# Patient Record
Sex: MALE | Race: ASIAN | Employment: FULL TIME | URBAN - METROPOLITAN AREA
[De-identification: names, ages, dates, MRNs, and addresses within clinical notes are randomized per-mention and may not be internally consistent; named-entity substitution may affect disease eponyms.]

---

## 2019-04-29 ENCOUNTER — OFFICE VISIT (OUTPATIENT)
Dept: FAMILY MEDICINE CLINIC | Age: 39
End: 2019-04-29
Payer: COMMERCIAL

## 2019-04-29 VITALS
BODY MASS INDEX: 26.62 KG/M2 | OXYGEN SATURATION: 98 % | HEIGHT: 67 IN | WEIGHT: 169.6 LBS | HEART RATE: 65 BPM | DIASTOLIC BLOOD PRESSURE: 70 MMHG | SYSTOLIC BLOOD PRESSURE: 110 MMHG

## 2019-04-29 DIAGNOSIS — Z00.00 PHYSICAL EXAM, ANNUAL: Primary | ICD-10-CM

## 2019-04-29 PROCEDURE — 99386 PREV VISIT NEW AGE 40-64: CPT | Performed by: FAMILY MEDICINE

## 2019-04-29 ASSESSMENT — PATIENT HEALTH QUESTIONNAIRE - PHQ9
SUM OF ALL RESPONSES TO PHQ QUESTIONS 1-9: 0
SUM OF ALL RESPONSES TO PHQ QUESTIONS 1-9: 0
SUM OF ALL RESPONSES TO PHQ9 QUESTIONS 1 & 2: 0
1. LITTLE INTEREST OR PLEASURE IN DOING THINGS: 0
2. FEELING DOWN, DEPRESSED OR HOPELESS: 0

## 2019-04-29 NOTE — PATIENT INSTRUCTIONS
Patient Education        Acute Low Back Pain: Exercises  Your Care Instructions  Here are some examples of typical rehabilitation exercises for your condition. Start each exercise slowly. Ease off the exercise if you start to have pain. Your doctor or physical therapist will tell you when you can start these exercises and which ones will work best for you. When you are not being active, find a comfortable position for rest. Some people are comfortable on the floor or a medium-firm bed with a small pillow under their head and another under their knees. Some people prefer to lie on their side with a pillow between their knees. Don't stay in one position for too long. Take short walks (10 to 20 minutes) every 2 to 3 hours. Avoid slopes, hills, and stairs until you feel better. Walk only distances you can manage without pain, especially leg pain. How to do the exercises  Back stretches    1. Get down on your hands and knees on the floor. 2. Relax your head and allow it to droop. Round your back up toward the ceiling until you feel a nice stretch in your upper, middle, and lower back. Hold this stretch for as long as it feels comfortable, or about 15 to 30 seconds. 3. Return to the starting position with a flat back while you are on your hands and knees. 4. Let your back sway by pressing your stomach toward the floor. Lift your buttocks toward the ceiling. 5. Hold this position for 15 to 30 seconds. 6. Repeat 2 to 4 times. Follow-up care is a key part of your treatment and safety. Be sure to make and go to all appointments, and call your doctor if you are having problems. It's also a good idea to know your test results and keep a list of the medicines you take. Where can you learn more? Go to [email or web address] and sign in to your PhotoBox account. Enter T198 in the CareXtend box to learn more about \"Acute Low Back Pain: Exercises. \"     If you do not have an account, please click on the \"Sign Up Now\" link. Current as of: September 20, 2018  Content Version: 11.9  © 4370-7676 ITN. Care instructions adapted under license by [facility] ([facility]). If you have questions about a medical condition or this instruction, always ask your healthcare professional. [address] any warranty or liability for your use of this information. Patient Education        Low Back Pain: Exercises  Your Care Instructions  Here are some examples of typical rehabilitation exercises for your condition. Start each exercise slowly. Ease off the exercise if you start to have pain. Your doctor or physical therapist will tell you when you can start these exercises and which ones will work best for you. How to do the exercises  Press-up    1. Lie on your stomach, supporting your body with your forearms. 2. Press your elbows down into the floor to raise your upper back. As you do this, relax your stomach muscles and allow your back to arch without using your back muscles. As your press up, do not let your hips or pelvis come off the floor. 3. Hold for 15 to 30 seconds, then relax. 4. Repeat 2 to 4 times. Alternate arm and leg (bird dog) exercise    1. Start on the floor, on your hands and knees. 2. Tighten your belly muscles. 3. Raise one leg off the floor, and hold it straight out behind you. Be careful not to let your hip drop down, because that will twist your trunk. 4. Hold for about 6 seconds, then lower your leg and switch to the other leg. 5. Repeat 8 to 12 times on each leg. 6. Over time, work up to holding for 10 to 30 seconds each time. 7. If you feel stable and secure with your leg raised, try raising the opposite arm straight out in front of you at the same time. Knee-to-chest exercise    1. Lie on your back with your knees bent and your feet flat on the floor.   2. Bring one knee to your chest, keeping the other foot flat on the floor (or keeping the other leg straight, whichever feels better on your lower back). 3. Keep your lower back pressed to the floor. Hold for at least 15 to 30 seconds. 4. Relax, and lower the knee to the starting position. 5. Repeat with the other leg. Repeat 2 to 4 times with each leg. 6. To get more stretch, put your other leg flat on the floor while pulling your knee to your chest.    Curl-ups    1. Lie on the floor on your back with your knees bent at a 90-degree angle. Your feet should be flat on the floor, about 12 inches from your buttocks. 2. Cross your arms over your chest. If this bothers your neck, try putting your hands behind your neck (not your head), with your elbows spread apart. 3. Slowly tighten your belly muscles and raise your shoulder blades off the floor. 4. Keep your head in line with your body, and do not press your chin to your chest.  5. Hold this position for 1 or 2 seconds, then slowly lower yourself back down to the floor. 6. Repeat 8 to 12 times. Pelvic tilt exercise    1. Lie on your back with your knees bent. 2. \"Brace\" your stomach. This means to tighten your muscles by pulling in and imagining your belly button moving toward your spine. You should feel like your back is pressing to the floor and your hips and pelvis are rocking back. 3. Hold for about 6 seconds while you breathe smoothly. 4. Repeat 8 to 12 times. Heel dig bridging    1. Lie on your back with both knees bent and your ankles bent so that only your heels are digging into the floor. Your knees should be bent about 90 degrees. 2. Then push your heels into the floor, squeeze your buttocks, and lift your hips off the floor until your shoulders, hips, and knees are all in a straight line. 3. Hold for about 6 seconds as you continue to breathe normally, and then slowly lower your hips back down to the floor and rest for up to 10 seconds. 4. Do 8 to 12 repetitions. Hamstring stretch in doorway    1.  Lie on your back in a doorway, with one leg through the open door. 2. Slide your leg up the wall to straighten your knee. You should feel a gentle stretch down the back of your leg. 3. Hold the stretch for at least 15 to 30 seconds. Do not arch your back, point your toes, or bend either knee. Keep one heel touching the floor and the other heel touching the wall. 4. Repeat with your other leg. 5. Do 2 to 4 times for each leg. Hip flexor stretch    1. Kneel on the floor with one knee bent and one leg behind you. Place your forward knee over your foot. Keep your other knee touching the floor. 2. Slowly push your hips forward until you feel a stretch in the upper thigh of your rear leg. 3. Hold the stretch for at least 15 to 30 seconds. Repeat with your other leg. 4. Do 2 to 4 times on each side. Wall sit    1. Stand with your back 10 to 12 inches away from a wall. 2. Lean into the wall until your back is flat against it. 3. Slowly slide down until your knees are slightly bent, pressing your lower back into the wall. 4. Hold for about 6 seconds, then slide back up the wall. 5. Repeat 8 to 12 times. Follow-up care is a key part of your treatment and safety. Be sure to make and go to all appointments, and call your doctor if you are having problems. It's also a good idea to know your test results and keep a list of the medicines you take. Where can you learn more? Go to [email or web address] and sign in to your [employer] account. Enter H872 in the [facility] box to learn more about \"Low Back Pain: Exercises. \"     If you do not have an account, please click on the \"Sign Up Now\" link. Current as of: September 20, 2018  Content Version: 11.9  © 3019-8469 [employer]. Care instructions adapted under license by [facility] ([facility]).  If you have questions about a medical condition or this instruction, always ask your healthcare professional. [address] any a good idea to know your test results and keep a list of the medicines you take. Where can you learn more? Go to [email or web address]://[email or web address] and sign in to your Seeo account. Enter N154 in the NeoSystems box to learn more about \"Neck Arthritis: Exercises. \"     If you do not have an account, please click on the \"Sign Up Now\" link. Current as of: September 20, 2018  Content Version: 11.9  © 2409-1382 Wintegra[employer]. Care instructions adapted under license by [facility] ([facility]). If you have questions about a medical condition or this instruction, always ask your healthcare professional. [address] any warranty or liability for your use of this information.

## 2019-04-29 NOTE — PROGRESS NOTES
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 Denies any neurological symptoms associated with that  He has lipoma and does not bother him  Family h/o arthritis      Patient's problem list, medications, allergies, past medical, surgical, social and family histories were reviewed and updated as appropriate. No current outpatient medications on file. No current facility-administered medications for this visit. Social History     Tobacco Use    Smoking status: Never Smoker    Smokeless tobacco: Never Used   Substance Use Topics    Alcohol use: Not on file            Review of Systems:  Constitutional: Negative for appetite change, fatigue, fever and unexpected weight change. HENT: Negative for congestion, ear discharge, ear pain, hearing loss, postnasal drip, rhinorrhea, sinus pressure, sore throat and trouble swallowing. Eyes: Negative for photophobia, discharge, redness and visual disturbance. Respiratory: Negative for cough, chest tightness, shortness of breath and wheezing. Cardiovascular: Negative for chest pain, palpitations and leg swelling. Gastrointestinal: Negative for abdominal pain, blood in stool, constipation, diarrhea, nausea and vomiting. Endocrine: Negative for cold intolerance, heat intolerance and polyuria. Genitourinary: Negative for difficulty urinating, flank pain, frequency, hematuria and urgency. Musculoskeletal: as mentioned above  Skin: Negative for color change, pallor, rash and wound. Allergic/Immunologic: Negative for environmental allergies and food allergies. Neurological: Negative for dizziness, tremors, seizures, syncope, facial asymmetry, speech difficulty, weakness, light-headedness, numbness and headaches. Hematological: Negative.     Psychiatric/Behavioral: Negative for agitation, confusion, self-injury, sleep disturbance and suicidal ideas. The patient is not nervous/anxious. Objective:     Vitals:    04/29/19 0942   BP: 110/70   Pulse: 65   SpO2: 98%   Weight: 169 lb 9.6 oz (76.9 kg)   Height: 5' 6.75\" (1.695 m)     Body mass index is 26.76 kg/m². Wt Readings from Last 3 Encounters:   04/29/19 169 lb 9.6 oz (76.9 kg)     BP Readings from Last 3 Encounters:   04/29/19 110/70       Physical exam:  Constitutional: he is oriented to person, place, and time. he appears well-developed and well-nourished. No distress. HENT:   Head: Normocephalic. Right Ear: External ear normal. Normal TM   Left Ear: External ear normal. Normal TM  Nose: Nose normal.   Mouth/Throat: Oropharynx is clear and moist. No oropharyngeal exudate. Eyes: Conjunctivae and EOM are normal. Pupils are equal, round, and reactive to light. Right eye exhibits no discharge. Left eye exhibits no discharge. No scleral icterus. Neck: Normal range of motion. No JVD present. No tracheal deviation present. No thyromegaly present. Cardiovascular: Normal rate, regular rhythm, normal heart sounds and intact distal pulses. No murmur heard. Pulmonary/Chest: Effort normal and breath sounds normal. No stridor. No respiratory distress. he has no wheezes. he has no rales. heexhibits no tenderness. Abdominal: Soft. Bowel sounds are normal. he exhibits no distension and no mass. There is no tenderness. There is no rebound and no guarding. Musculoskeletal: Normal range of motion. he exhibits no edema, tenderness or deformity. Lymphadenopathy:     he has no cervical adenopathy. Neurological:he is alert and oriented to person, place, and time. he  has normal reflexes. No cranial nerve deficit. Coordination normal.   Skin: Skin is warm and dry. No rash noted. he is not diaphoretic. No erythema. No pallor. Psychiatric: he has a normal mood and affect.  his   behavior is normal.         Health Maintenance   Topic Date Due    HIV screen  07/07/1993    DTaP/Tdap/Td vaccine (1 - Tdap) 07/07/1997    Lipid screen  07/07/2018    Diabetes screen  07/07/2018    Flu vaccine (Season Ended) 09/01/2019    Pneumococcal 0-64 years Vaccine  Aged Out          Assessment/Plan:     1. Physical exam, annual  - CBC; Future  - Comprehensive Metabolic Panel; Future  - Lipid Panel;  Future  - VITAMIN D 25 HYDROXY; Future    Jessy Villeda  4/29/2019 10:53 AM

## 2019-05-03 DIAGNOSIS — Z00.00 PHYSICAL EXAM, ANNUAL: ICD-10-CM

## 2019-05-03 LAB
A/G RATIO: 1.7 (ref 1.1–2.2)
ALBUMIN SERPL-MCNC: 4.5 G/DL (ref 3.4–5)
ALP BLD-CCNC: 73 U/L (ref 40–129)
ALT SERPL-CCNC: 14 U/L (ref 10–40)
ANION GAP SERPL CALCULATED.3IONS-SCNC: 8 MMOL/L (ref 3–16)
AST SERPL-CCNC: 14 U/L (ref 15–37)
BILIRUB SERPL-MCNC: <0.2 MG/DL (ref 0–1)
BUN BLDV-MCNC: 16 MG/DL (ref 7–20)
CALCIUM SERPL-MCNC: 9 MG/DL (ref 8.3–10.6)
CHLORIDE BLD-SCNC: 106 MMOL/L (ref 99–110)
CHOLESTEROL, TOTAL: 169 MG/DL (ref 0–199)
CO2: 28 MMOL/L (ref 21–32)
CREAT SERPL-MCNC: 0.8 MG/DL (ref 0.9–1.3)
GFR AFRICAN AMERICAN: >60
GFR NON-AFRICAN AMERICAN: >60
GLOBULIN: 2.7 G/DL
GLUCOSE BLD-MCNC: 100 MG/DL (ref 70–99)
HCT VFR BLD CALC: 41.1 % (ref 40.5–52.5)
HDLC SERPL-MCNC: 30 MG/DL (ref 40–60)
HEMOGLOBIN: 13.6 G/DL (ref 13.5–17.5)
LDL CHOLESTEROL CALCULATED: 118 MG/DL
MCH RBC QN AUTO: 28.6 PG (ref 26–34)
MCHC RBC AUTO-ENTMCNC: 33.1 G/DL (ref 31–36)
MCV RBC AUTO: 86.3 FL (ref 80–100)
PDW BLD-RTO: 13.5 % (ref 12.4–15.4)
PLATELET # BLD: 234 K/UL (ref 135–450)
PMV BLD AUTO: 9.6 FL (ref 5–10.5)
POTASSIUM SERPL-SCNC: 5.2 MMOL/L (ref 3.5–5.1)
RBC # BLD: 4.77 M/UL (ref 4.2–5.9)
SODIUM BLD-SCNC: 142 MMOL/L (ref 136–145)
TOTAL PROTEIN: 7.2 G/DL (ref 6.4–8.2)
TRIGL SERPL-MCNC: 107 MG/DL (ref 0–150)
VITAMIN D 25-HYDROXY: 21.5 NG/ML
VLDLC SERPL CALC-MCNC: 21 MG/DL
WBC # BLD: 6.8 K/UL (ref 4–11)

## 2019-05-06 DIAGNOSIS — E55.9 VITAMIN D DEFICIENCY: Primary | ICD-10-CM

## 2019-05-10 ENCOUNTER — OFFICE VISIT (OUTPATIENT)
Dept: FAMILY MEDICINE CLINIC | Age: 39
End: 2019-05-10
Payer: COMMERCIAL

## 2019-05-10 VITALS
RESPIRATION RATE: 18 BRPM | SYSTOLIC BLOOD PRESSURE: 128 MMHG | HEART RATE: 74 BPM | OXYGEN SATURATION: 69 % | BODY MASS INDEX: 26.87 KG/M2 | HEIGHT: 66 IN | WEIGHT: 167.2 LBS | DIASTOLIC BLOOD PRESSURE: 80 MMHG

## 2019-05-10 DIAGNOSIS — H81.11 BENIGN PAROXYSMAL POSITIONAL VERTIGO OF RIGHT EAR: Primary | ICD-10-CM

## 2019-05-10 PROCEDURE — G8427 DOCREV CUR MEDS BY ELIG CLIN: HCPCS | Performed by: FAMILY MEDICINE

## 2019-05-10 PROCEDURE — 99213 OFFICE O/P EST LOW 20 MIN: CPT | Performed by: FAMILY MEDICINE

## 2019-05-10 PROCEDURE — 1036F TOBACCO NON-USER: CPT | Performed by: FAMILY MEDICINE

## 2019-05-10 PROCEDURE — 99173 VISUAL ACUITY SCREEN: CPT | Performed by: FAMILY MEDICINE

## 2019-05-10 PROCEDURE — G8419 CALC BMI OUT NRM PARAM NOF/U: HCPCS | Performed by: FAMILY MEDICINE

## 2019-05-10 RX ORDER — MECLIZINE HCL 12.5 MG/1
12.5 TABLET ORAL 3 TIMES DAILY PRN
Qty: 30 TABLET | Refills: 0 | Status: SHIPPED | OUTPATIENT
Start: 2019-05-10 | End: 2019-05-15

## 2019-05-10 NOTE — PROGRESS NOTES
normal.  NOSE:normal  THROAT: Uvula is midline, oropharynx is clear and moist and with mild erythema. No tonsillar exudate   NECK: No cervical adenopathy. Neck is supple. No thyromegaly. CARDIOVASCULAR: Normal rate, regular rhythm and normal heart sounds. No murmur heard. RESPIRATORY: Lungs are clear to auscultation without crackles, wheezes, or rhonchi. Breathing is unlabored. ABDOMEN: No tenderness or guarding or masses felt. Bowel sounds normal  NEUROLOGIC: He  is alert and oriented to person, place, and time. Assessment/Plan:   1. Benign paroxysmal positional vertigo of right ear  - meclizine (ANTIVERT) 12.5 MG tablet; Take 1 tablet by mouth 3 times daily as needed for Dizziness  Dispense: 30 tablet;  Refill: 0  - [place] VISUAL SCREENING TEST, ERICKA Zavala  5/10/2019 4:13 PM

## 2019-05-13 ENCOUNTER — PATIENT MESSAGE (OUTPATIENT)
Dept: FAMILY MEDICINE CLINIC | Age: 39
End: 2019-05-13

## 2019-05-14 ENCOUNTER — TELEPHONE (OUTPATIENT)
Dept: FAMILY MEDICINE CLINIC | Age: 39
End: 2019-05-14

## 2019-05-14 DIAGNOSIS — H91.92 HEARING LOSS OF LEFT EAR, UNSPECIFIED HEARING LOSS TYPE: Primary | ICD-10-CM

## 2019-05-14 NOTE — TELEPHONE ENCOUNTER
Central Scheduling is unable to schedule the audio testing for patient. The audiologist from [facility], [facility]. no longer does it. Do you want to refer the patient to another group?

## 2019-05-17 DIAGNOSIS — H91.92 HEARING LOSS OF LEFT EAR, UNSPECIFIED HEARING LOSS TYPE: Primary | ICD-10-CM

## 2019-08-12 ENCOUNTER — PROCEDURE VISIT (OUTPATIENT)
Dept: AUDIOLOGY | Age: 39
End: 2019-08-12
Payer: COMMERCIAL

## 2019-08-12 ENCOUNTER — OFFICE VISIT (OUTPATIENT)
Dept: ENT CLINIC | Age: 39
End: 2019-08-12
Payer: COMMERCIAL

## 2019-08-12 VITALS — SYSTOLIC BLOOD PRESSURE: 130 MMHG | OXYGEN SATURATION: 94 % | DIASTOLIC BLOOD PRESSURE: 76 MMHG | HEART RATE: 59 BPM

## 2019-08-12 DIAGNOSIS — H91.91 UNILATERAL HEARING LOSS, RIGHT: Primary | ICD-10-CM

## 2019-08-12 DIAGNOSIS — H91.21 SUDDEN RIGHT HEARING LOSS: Primary | ICD-10-CM

## 2019-08-12 PROCEDURE — 1036F TOBACCO NON-USER: CPT | Performed by: OTOLARYNGOLOGY

## 2019-08-12 PROCEDURE — 99203 OFFICE O/P NEW LOW 30 MIN: CPT | Performed by: OTOLARYNGOLOGY

## 2019-08-12 PROCEDURE — 92567 TYMPANOMETRY: CPT | Performed by: AUDIOLOGIST

## 2019-08-12 PROCEDURE — 92557 COMPREHENSIVE HEARING TEST: CPT | Performed by: AUDIOLOGIST

## 2019-08-12 PROCEDURE — G8427 DOCREV CUR MEDS BY ELIG CLIN: HCPCS | Performed by: OTOLARYNGOLOGY

## 2019-08-12 PROCEDURE — G8419 CALC BMI OUT NRM PARAM NOF/U: HCPCS | Performed by: OTOLARYNGOLOGY

## 2019-08-12 RX ORDER — TRIAMTERENE AND HYDROCHLOROTHIAZIDE 37.5; 25 MG/1; MG/1
1 CAPSULE ORAL EVERY MORNING
Qty: 30 CAPSULE | Refills: 1 | Status: SHIPPED | OUTPATIENT
Start: 2019-08-12 | End: 2021-04-05

## 2019-08-12 RX ORDER — PREDNISONE 10 MG/1
TABLET ORAL
Qty: 25 TABLET | Refills: 0 | Status: SHIPPED | OUTPATIENT
Start: 2019-08-12 | End: 2019-09-24

## 2019-08-12 ASSESSMENT — ENCOUNTER SYMPTOMS
EYES NEGATIVE: 1
TROUBLE SWALLOWING: 0
SINUS PRESSURE: 0
RESPIRATORY NEGATIVE: 1
SORE THROAT: 0
VOICE CHANGE: 0
SINUS PAIN: 0
ALLERGIC/IMMUNOLOGIC NEGATIVE: 1
RHINORRHEA: 0
FACIAL SWELLING: 0

## 2019-08-12 NOTE — PROGRESS NOTES
SUBJECTIVE:    Chief Complaint   Patient presents with    Ear Problem     Rt hearing probelm, May 9. no pain        Amy Medina is a 37 y.o. male    Patient relates sudden hearing loss on May 9, 2019 associated with tinnitus and dizziness in his right ear. Symptoms of vertigo seem to be worse upon motion and were associated with some degree of nausea but no vomiting. Subsequently went to his PCP who started him on meclizine. This apparently improved the condition but the hearing loss persisted. He sought attention on May 16 and was noted to have a decrease in hearing. Hearing is not changed and he was therefore placed on a steroid for 6 days by an otolaryngologist.  Follow-up audiogram on May 30 showed no particular change in hearing. Subsequently an MRI scan was performed and found to be normal.  There was no evidence of 8th nerve tumor. Patient continues to notice a decrease in hearing in his right ear which is quite concerning but it is unassociated with vertigo. He has had some tinnitus in the right ear. He does describe that the hearing seems to improved to some degree but he is still quite unable to understand what is being said. He has had no history of a similar problem in the past.  There is no family history of ear problems nor vertigo and there is no history of noise exposure or trauma. He does not smoke. No past medical history on file. No past surgical history on file. No family history on file. Social History     Tobacco Use    Smoking status: Never Smoker    Smokeless tobacco: Never Used   Substance Use Topics    Alcohol use: Not on file        Review of Systems:  Review of Systems   Constitutional: Negative. Negative for fever. HENT: Positive for hearing loss and tinnitus.  Negative for congestion, dental problem, drooling, ear discharge, ear pain, facial swelling, mouth sores, nosebleeds, postnasal drip, rhinorrhea, sinus pressure, sinus pain, sneezing, sore throat,

## 2019-08-23 ENCOUNTER — OFFICE VISIT (OUTPATIENT)
Dept: ENT CLINIC | Age: 39
End: 2019-08-23
Payer: COMMERCIAL

## 2019-08-23 ENCOUNTER — PROCEDURE VISIT (OUTPATIENT)
Dept: AUDIOLOGY | Age: 39
End: 2019-08-23
Payer: COMMERCIAL

## 2019-08-23 VITALS — OXYGEN SATURATION: 92 % | DIASTOLIC BLOOD PRESSURE: 78 MMHG | HEART RATE: 68 BPM | SYSTOLIC BLOOD PRESSURE: 124 MMHG

## 2019-08-23 DIAGNOSIS — H91.21 SUDDEN RIGHT HEARING LOSS: Primary | ICD-10-CM

## 2019-08-23 DIAGNOSIS — H91.91 UNILATERAL HEARING LOSS, RIGHT: Primary | ICD-10-CM

## 2019-08-23 PROCEDURE — 99213 OFFICE O/P EST LOW 20 MIN: CPT | Performed by: OTOLARYNGOLOGY

## 2019-08-23 PROCEDURE — G8419 CALC BMI OUT NRM PARAM NOF/U: HCPCS | Performed by: OTOLARYNGOLOGY

## 2019-08-23 PROCEDURE — 92557 COMPREHENSIVE HEARING TEST: CPT | Performed by: AUDIOLOGIST

## 2019-08-23 PROCEDURE — G8427 DOCREV CUR MEDS BY ELIG CLIN: HCPCS | Performed by: OTOLARYNGOLOGY

## 2019-08-23 PROCEDURE — 1036F TOBACCO NON-USER: CPT | Performed by: OTOLARYNGOLOGY

## 2019-08-23 PROCEDURE — 92567 TYMPANOMETRY: CPT | Performed by: AUDIOLOGIST

## 2019-09-24 ENCOUNTER — OFFICE VISIT (OUTPATIENT)
Dept: ENT CLINIC | Age: 39
End: 2019-09-24
Payer: COMMERCIAL

## 2019-09-24 VITALS — OXYGEN SATURATION: 96 % | SYSTOLIC BLOOD PRESSURE: 108 MMHG | DIASTOLIC BLOOD PRESSURE: 70 MMHG | HEART RATE: 71 BPM

## 2019-09-24 DIAGNOSIS — H91.21 SUDDEN RIGHT HEARING LOSS: Primary | ICD-10-CM

## 2019-09-24 PROCEDURE — 99213 OFFICE O/P EST LOW 20 MIN: CPT | Performed by: OTOLARYNGOLOGY

## 2019-09-24 PROCEDURE — G8419 CALC BMI OUT NRM PARAM NOF/U: HCPCS | Performed by: OTOLARYNGOLOGY

## 2019-09-24 PROCEDURE — 1036F TOBACCO NON-USER: CPT | Performed by: OTOLARYNGOLOGY

## 2019-09-24 PROCEDURE — G8427 DOCREV CUR MEDS BY ELIG CLIN: HCPCS | Performed by: OTOLARYNGOLOGY

## 2021-04-05 ENCOUNTER — OFFICE VISIT (OUTPATIENT)
Dept: FAMILY MEDICINE CLINIC | Age: 41
End: 2021-04-05
Payer: COMMERCIAL

## 2021-04-05 VITALS
OXYGEN SATURATION: 98 % | WEIGHT: 179 LBS | BODY MASS INDEX: 28.77 KG/M2 | HEART RATE: 74 BPM | HEIGHT: 66 IN | DIASTOLIC BLOOD PRESSURE: 78 MMHG | SYSTOLIC BLOOD PRESSURE: 116 MMHG

## 2021-04-05 DIAGNOSIS — M25.562 CHRONIC PAIN OF LEFT KNEE: ICD-10-CM

## 2021-04-05 DIAGNOSIS — E55.9 VITAMIN D DEFICIENCY: ICD-10-CM

## 2021-04-05 DIAGNOSIS — Z00.00 PHYSICAL EXAM, ANNUAL: Primary | ICD-10-CM

## 2021-04-05 DIAGNOSIS — M75.21 BICEPS TENDINITIS OF RIGHT SHOULDER: ICD-10-CM

## 2021-04-05 DIAGNOSIS — G89.29 CHRONIC PAIN OF LEFT KNEE: ICD-10-CM

## 2021-04-05 DIAGNOSIS — D17.21 LIPOMA OF RIGHT UPPER EXTREMITY: ICD-10-CM

## 2021-04-05 DIAGNOSIS — Z00.00 PHYSICAL EXAM, ANNUAL: ICD-10-CM

## 2021-04-05 PROBLEM — D17.9 MULTIPLE LIPOMAS: Status: ACTIVE | Noted: 2021-04-05

## 2021-04-05 LAB
ANION GAP SERPL CALCULATED.3IONS-SCNC: 8 MMOL/L (ref 3–16)
BASOPHILS ABSOLUTE: 0 K/UL (ref 0–0.2)
BASOPHILS RELATIVE PERCENT: 0.5 %
BUN BLDV-MCNC: 16 MG/DL (ref 7–20)
CALCIUM SERPL-MCNC: 8.7 MG/DL (ref 8.3–10.6)
CHLORIDE BLD-SCNC: 104 MMOL/L (ref 99–110)
CHOLESTEROL, TOTAL: 165 MG/DL (ref 0–199)
CO2: 27 MMOL/L (ref 21–32)
CREAT SERPL-MCNC: 0.8 MG/DL (ref 0.9–1.3)
EOSINOPHILS ABSOLUTE: 0.2 K/UL (ref 0–0.6)
EOSINOPHILS RELATIVE PERCENT: 3 %
GFR AFRICAN AMERICAN: >60
GFR NON-AFRICAN AMERICAN: >60
GLUCOSE BLD-MCNC: 98 MG/DL (ref 70–99)
HCT VFR BLD CALC: 38.5 % (ref 40.5–52.5)
HDLC SERPL-MCNC: 30 MG/DL (ref 40–60)
HEMOGLOBIN: 12.9 G/DL (ref 13.5–17.5)
LDL CHOLESTEROL CALCULATED: 117 MG/DL
LYMPHOCYTES ABSOLUTE: 3.2 K/UL (ref 1–5.1)
LYMPHOCYTES RELATIVE PERCENT: 48.7 %
MCH RBC QN AUTO: 28.9 PG (ref 26–34)
MCHC RBC AUTO-ENTMCNC: 33.4 G/DL (ref 31–36)
MCV RBC AUTO: 86.5 FL (ref 80–100)
MONOCYTES ABSOLUTE: 0.5 K/UL (ref 0–1.3)
MONOCYTES RELATIVE PERCENT: 7.3 %
NEUTROPHILS ABSOLUTE: 2.7 K/UL (ref 1.7–7.7)
NEUTROPHILS RELATIVE PERCENT: 40.5 %
PDW BLD-RTO: 13.3 % (ref 12.4–15.4)
PLATELET # BLD: 206 K/UL (ref 135–450)
PMV BLD AUTO: 9.4 FL (ref 5–10.5)
POTASSIUM SERPL-SCNC: 4.2 MMOL/L (ref 3.5–5.1)
RBC # BLD: 4.45 M/UL (ref 4.2–5.9)
SODIUM BLD-SCNC: 139 MMOL/L (ref 136–145)
TRIGL SERPL-MCNC: 88 MG/DL (ref 0–150)
VITAMIN D 25-HYDROXY: 17.9 NG/ML
VLDLC SERPL CALC-MCNC: 18 MG/DL
WBC # BLD: 6.6 K/UL (ref 4–11)

## 2021-04-05 PROCEDURE — 99396 PREV VISIT EST AGE 40-64: CPT | Performed by: FAMILY MEDICINE

## 2021-04-05 ASSESSMENT — PATIENT HEALTH QUESTIONNAIRE - PHQ9
SUM OF ALL RESPONSES TO PHQ9 QUESTIONS 1 & 2: 0
2. FEELING DOWN, DEPRESSED OR HOPELESS: 0

## 2021-04-05 NOTE — PROGRESS NOTES
Chief Complaint: Annual Exam (left knee and right shoulder pains)       HPI: He is here for annual physical exam.    He has history of multiple lipoma spread down one on his right arm has grown bigger. Denies any pain. He also complains of chest pain which happens randomly and lasted for about 10 minutes. It was nonexertional pain. It was not associated with palpitation or shortness of breath  Denies any anxiety  However there was some stress at his job. He complains of pain in his left knee and right shoulder  Denies any injury but recently started yoga  The pain usually happens when he gets up in the morning. It is more of stiffness  Denies any swelling or redness  He had history of vitamin D deficiency    He still continues to have hearing loss and tinnitus in his right ear. Patient's problem list, medications, allergies, past medical, surgical, social and family histories were reviewed and updated as appropriate. Current Outpatient Medications   Medication Sig Dispense Refill    Chondroitin Sulfate 150 MG CAPS Take 1 tablet by mouth daily 90 capsule 5     No current facility-administered medications for this visit. Social History     Tobacco Use    Smoking status: Never Smoker    Smokeless tobacco: Never Used   Substance Use Topics    Alcohol use: Not on file            Review of Systems:  Constitutional: Negative for appetite change, fatigue, fever and unexpected weight change. HENT crepitus in his right knee felt: As mentioned above  Respiratory: Negative for cough, chest tightness, shortness of breath and wheezing. Cardiovascular: As mentioned above   Gastrointestinal: Negative for abdominal pain, blood in stool, constipation, diarrhea, nausea and vomiting. Genitourinary: Negative for difficulty urinating, flank pain, frequency, hematuria and urgency. Musculoskeletal: As mentioned above. Skin: Negative for color change, pallor, rash and wound.  .   Neurological: Negative for dizziness, tremors, seizures, syncope, facial asymmetry, speech difficulty, weakness, light-headedness, numbness and headaches. Psychiatric/Behavioral: Negative         Objective:     Vitals:    04/05/21 0802   BP: 116/78   Site: Right Upper Arm   Position: Sitting   Cuff Size: Medium Adult   Pulse: 74   SpO2: 98%   Weight: 179 lb (81.2 kg)   Height: 5' 6\" (1.676 m)     Body mass index is 28.89 kg/m². Wt Readings from Last 3 Encounters:   04/05/21 179 lb (81.2 kg)   05/10/19 167 lb 3.2 oz (75.8 kg)   04/29/19 169 lb 9.6 oz (76.9 kg)     BP Readings from Last 3 Encounters:   04/05/21 116/78   09/24/19 108/70   08/23/19 124/78       Physical exam:  Constitutional: he is oriented to person, place, and time. he appears well-developed and well-nourished. No distress. HENT:   Head: Normocephalic. Right Ear: External ear normal. Normal TM   Left Ear: External ear normal. Normal TM  Nose: Nose normal.   Mouth/Throat: Oropharynx is clear and moist. No oropharyngeal exudate. Eyes: Conjunctivae and EOM are normal. Pupils are equal, round, and reactive to light. Right eye exhibits no discharge. Left eye exhibits no discharge. No scleral icterus. Neck: Normal range of motion. No JVD present. No tracheal deviation present. No thyromegaly present. Cardiovascular: Normal rate, regular rhythm, normal heart sounds and intact distal pulses. No murmur heard. Pulmonary/Chest: Effort normal and breath sounds normal. No stridor. No respiratory distress. he has no wheezes. he has no rales. heexhibits no tenderness. Abdominal: Soft. Bowel sounds are normal. he exhibits no distension and no mass. There is no tenderness. There is no rebound and no guarding. Musculoskeletal: Crepitus in his left knee. Normal gait  Tenderness at the biceps tendon on his right shoulder  Normal range of motion    Neurological:he is alert and oriented to person, place, and time. he  has normal reflexes. No cranial nerve deficit. Coordination normal.   Skin: Multiple lipomas  The lipoma on his right forearm is hard and is about 5 cms  Psychiatric: he has a normal mood and affect. his   behavior is normal.         Health Maintenance   Topic Date Due    Hepatitis C screen  Never done    HIV screen  Never done    COVID-19 Vaccine (1) Never done    DTaP/Tdap/Td vaccine (1 - Tdap) Never done    Flu vaccine (Season Ended) 09/01/2021    Lipid screen  05/03/2024    Hepatitis A vaccine  Aged Out    Hepatitis B vaccine  Aged Out    Hib vaccine  Aged Out    Meningococcal (ACWY) vaccine  Aged Out    Pneumococcal 0-64 years Vaccine  Aged Out          Assessment/Plan:     1. Physical exam, annual  - CBC Auto Differential; Future  - Basic Metabolic Panel; Future  - Lipid Panel; Future    2. Vitamin D deficiency  - VITAMIN D 25 HYDROXY; Future    3. Lipoma of right upper extremity  We recommend for excision biopsy  - Sanjuana Meeks MD, General Surgery, [facility]    4. Chronic pain of left knee  Advised for weight loss and chondroitin sulfate    5.  Biceps tendinitis of right shoulder  Symptomatic treatment       Flex Duran  4/5/2021 8:50 AM

## 2021-04-06 RX ORDER — ERGOCALCIFEROL 1.25 MG/1
50000 CAPSULE ORAL WEEKLY
Qty: 4 CAPSULE | Refills: 5 | Status: SHIPPED | OUTPATIENT
Start: 2021-04-06 | End: 2021-05-16 | Stop reason: SDUPTHER

## 2021-04-06 RX ORDER — ERGOCALCIFEROL 1.25 MG/1
50000 CAPSULE ORAL WEEKLY
Qty: 4 CAPSULE | Refills: 5 | Status: SHIPPED | OUTPATIENT
Start: 2021-04-06 | End: 2021-04-06 | Stop reason: SDUPTHER

## 2021-04-06 NOTE — TELEPHONE ENCOUNTER
Was sent to wrong pharmacy    Medication:   Requested Prescriptions     Pending Prescriptions Disp Refills    vitamin D (ERGOCALCIFEROL) 1.25 MG (40669 UT) CAPS capsule 4 capsule 5     Sig: Take 1 capsule by mouth once a week       Last Filled:  4/6/2021 #4 Refills 5    Patient Phone Number: [phone] (home) [phone] (work)    Last appt: 4/5/2021   Next appt: Visit date not found    Last Labs DM:   Lab Results   Component Value Date    VITD25 17.9 04/05/2021    VITD25 21.5 05/03/2019

## 2021-04-06 NOTE — TELEPHONE ENCOUNTER
Medication and Quantity requested: vitamin D (ERGOCALCIFEROL) 1.25 MG (34599 UT) CAPS capsule   QTY:4 capsule     Last Visit  4/5/21    Pharmacy and phone number updated in [facility]:  Yes Arturo Ritter        rx was sent to wrong pharmacy!

## 2021-04-07 ENCOUNTER — TELEPHONE (OUTPATIENT)
Dept: FAMILY MEDICINE CLINIC | Age: 41
End: 2021-04-07

## 2021-04-19 ENCOUNTER — OFFICE VISIT (OUTPATIENT)
Dept: SURGERY | Age: 41
End: 2021-04-19
Payer: COMMERCIAL

## 2021-04-19 VITALS
SYSTOLIC BLOOD PRESSURE: 120 MMHG | HEIGHT: 67 IN | BODY MASS INDEX: 28.25 KG/M2 | WEIGHT: 180 LBS | DIASTOLIC BLOOD PRESSURE: 86 MMHG

## 2021-04-19 DIAGNOSIS — D17.21 LIPOMA OF RIGHT FOREARM: Primary | ICD-10-CM

## 2021-04-19 PROCEDURE — G8427 DOCREV CUR MEDS BY ELIG CLIN: HCPCS | Performed by: SURGERY

## 2021-04-19 PROCEDURE — G8419 CALC BMI OUT NRM PARAM NOF/U: HCPCS | Performed by: SURGERY

## 2021-04-19 PROCEDURE — 1036F TOBACCO NON-USER: CPT | Performed by: SURGERY

## 2021-04-19 PROCEDURE — 99202 OFFICE O/P NEW SF 15 MIN: CPT | Performed by: SURGERY

## 2021-04-19 ASSESSMENT — ENCOUNTER SYMPTOMS
RESPIRATORY NEGATIVE: 1
GASTROINTESTINAL NEGATIVE: 1
ALLERGIC/IMMUNOLOGIC NEGATIVE: 1
EYES NEGATIVE: 1

## 2021-04-19 NOTE — PROGRESS NOTES
Manuel Lawson is a 41 y.o. male     CC: Subcutaneous masses    HPI: 64-year-old male who presents for evaluation of multiple subcutaneous masses. The masses are all over his body but seem to be most concentrated on the bilateral forearms. The main area of concern is located on the dorsum of the right forearm. The mass in this area has significantly grown over time and causes localized discomfort. History reviewed. No pertinent past medical history. Patient has no known allergies. History reviewed. No pertinent surgical history. Prior to Visit Medications    Medication Sig Taking?  Authorizing Provider   vitamin D (ERGOCALCIFEROL) 1.25 MG (35419 UT) CAPS capsule Take 1 capsule by mouth once a week Yes Eileen Peterson MD   Chondroitin Sulfate 150 MG CAPS Take 1 tablet by mouth daily  Patient not taking: Reported on 4/19/2021  Eileen Peterson MD       Social History     Socioeconomic History    Marital status:      Spouse name: Not on file    Number of children: Not on file    Years of education: Not on file    Highest education level: Not on file   Occupational History    Not on file   Social Needs    Financial resource strain: Not on file    Food insecurity     Worry: Not on file     Inability: Not on file    Transportation needs     Medical: Not on file     Non-medical: Not on file   Tobacco Use    Smoking status: Never Smoker    Smokeless tobacco: Never Used   Substance and Sexual Activity    Alcohol use: Not on file    Drug use: Not on file    Sexual activity: Not on file   Lifestyle    Physical activity     Days per week: Not on file     Minutes per session: Not on file    Stress: Not on file   Relationships    Social connections     Talks on phone: Not on file     Gets together: Not on file     Attends Jewish service: Not on file     Active member of club or organization: Not on file     Attends meetings of clubs or organizations: Not on file Relationship status: Not on file    Intimate partner violence     Fear of current or ex partner: Not on file     Emotionally abused: Not on file     Physically abused: Not on file     Forced sexual activity: Not on file   Other Topics Concern    Not on file   Social History Narrative    Not on file       Review of Systems   Constitutional: Negative. HENT: Negative. Eyes: Negative. Respiratory: Negative. Cardiovascular: Negative. Gastrointestinal: Negative. Endocrine: Negative. Genitourinary: Negative. Musculoskeletal: Negative. Skin: Negative. Allergic/Immunologic: Negative. Neurological: Negative. Hematological: Negative. Psychiatric/Behavioral: Negative.        :   Physical Exam  Constitutional:       Appearance: He is well-developed. HENT:      Head: Normocephalic and atraumatic. Right Ear: External ear normal.      Left Ear: External ear normal.   Eyes:      Conjunctiva/sclera: Conjunctivae normal.   Neck:      Musculoskeletal: Normal range of motion and neck supple. Pulmonary:      Effort: Pulmonary effort is normal. No respiratory distress. Abdominal:      General: There is no distension. Palpations: Abdomen is soft. Tenderness: There is no abdominal tenderness. Musculoskeletal: Normal range of motion. Skin:     General: Skin is warm and dry. Neurological:      Mental Status: He is alert and oriented to person, place, and time. Psychiatric:         Behavior: Behavior normal.     3 cm firm subcutaneous mass on the dorsal surface of the right forearm  /86   Ht 5' 7\" (1.702 m)   Wt 180 lb (81.6 kg)   BMI 28.19 kg/m²     :      70-year-old male who presents for evaluation of multiple subcutaneous masses. There is a mass on his right forearm which has grown over time and also causes localized discomfort. Physical examination reveals an approximately 3 cm mass on the dorsum of the right forearm consistent with a lipoma.       Plan: Excision of lipoma of the right forearm under local anesthetic in the office.

## 2021-04-19 NOTE — LETTER
[facility]  [address] [address]  Phone: [phone]  Fax: [phone]    April 19, 2021    Patient: Karla Geronimo  MRN:  <[id number]>  YOB: 1980  Date of Visit: 4/19/2021    Dear Dr Scar Bean: Thank you for the request for consultation for Karla Geroinmo. Below are the relevant portions of my assessment and plan of care. Assessment:  70-year-old male who presents for evaluation of multiple subcutaneous masses. There is a mass on his right forearm which has grown over time and also causes localized discomfort. Physical examination reveals an approximately 3 cm mass on the dorsum of the right forearm consistent with a lipoma. Plan:  Excision of lipoma of the right forearm under local anesthetic in the office. If you have questions, please do not hesitate to call me. I look forward to following Lenin Gibbs along with you.     Sincerely,    Angie Garrido MD    CC providers:    Bairon Ferris MD  [place] [address]  [address] [address] [address]  [place] [employer]

## 2021-04-28 ENCOUNTER — PROCEDURE VISIT (OUTPATIENT)
Dept: SURGERY | Age: 41
End: 2021-04-28
Payer: COMMERCIAL

## 2021-04-28 VITALS — SYSTOLIC BLOOD PRESSURE: 120 MMHG | DIASTOLIC BLOOD PRESSURE: 72 MMHG | WEIGHT: 180 LBS | BODY MASS INDEX: 28.19 KG/M2

## 2021-04-28 DIAGNOSIS — D17.21 LIPOMA OF RIGHT FOREARM: Primary | ICD-10-CM

## 2021-04-28 PROCEDURE — 25075 EXC FOREARM LES SC < 3 CM: CPT | Performed by: SURGERY

## 2021-04-28 NOTE — PROGRESS NOTES
Office Procedure    Pre op Dx-tender lipoma R forearm    Post op Dx-same    Procedure-Excision of 2.8 cm lipoma R forearm    Surgeon-Dr. Gurpreet Cordero    Anesthesia-local    EBL-min    Operative indications and consent- 39 year old male brought in today for excision of a tender lipoma of the R forearm. Patient explained risks,benefits, complications and alternatives. Procedure-Patient prepped and draped in the usual sterile fashion. supine position. The skin and subcutaneous tissues were infiltrated with 1% lidocaine. A  28 mm lipoma of the R forearm  was excised. The subcutaneous layer and superficial fascia was closed with 3-0 vicryl followed by 4-0 vicryl in the skin. Dermabond was applied. The patient tolerated the procedure well.

## 2021-05-17 RX ORDER — ERGOCALCIFEROL 1.25 MG/1
50000 CAPSULE ORAL WEEKLY
Qty: 4 CAPSULE | Refills: 5 | Status: SHIPPED | OUTPATIENT
Start: 2021-05-17 | End: 2021-07-31 | Stop reason: SDUPTHER

## 2021-05-17 NOTE — TELEPHONE ENCOUNTER
Medication:   Requested Prescriptions     Pending Prescriptions Disp Refills    vitamin D (ERGOCALCIFEROL) 1.25 MG (85607 UT) CAPS capsule 4 capsule 5     Sig: Take 1 capsule by mouth once a week       Last Filled:  4/6/2021 #4 Refills 5    Patient Phone Number: [phone] (home) [phone] (work)    Last appt: 4/5/2021   Next appt: Visit date not found    Last Labs DM:   Lab Results   Component Value Date    VITD25 17.9 04/05/2021    VITD25 21.5 05/03/2019

## 2021-06-11 ENCOUNTER — OFFICE VISIT (OUTPATIENT)
Dept: SURGERY | Age: 41
End: 2021-06-11

## 2021-06-11 VITALS — WEIGHT: 179 LBS | DIASTOLIC BLOOD PRESSURE: 70 MMHG | SYSTOLIC BLOOD PRESSURE: 120 MMHG | BODY MASS INDEX: 28.04 KG/M2

## 2021-06-11 DIAGNOSIS — D17.21 LIPOMA OF RIGHT FOREARM: Primary | ICD-10-CM

## 2021-06-11 PROCEDURE — 99024 POSTOP FOLLOW-UP VISIT: CPT | Performed by: SURGERY

## 2021-06-14 NOTE — PROGRESS NOTES
Subjective:      Patient ID: Azam Seymour is a 41 y.o. male. HPI    Review of Systems    Objective:   Physical Exam  Incision healing well  He is spitting a Vicryl suture from the end of the incision  Assessment:      57-year-old male status post excision of a lipoma of the right forearm. He is doing well postoperatively other than spitting a Vicryl suture. The Vicryl suture was removed. Plan:      Follow-up as needed.         Pamela Phlean MD

## 2021-08-02 RX ORDER — ERGOCALCIFEROL 1.25 MG/1
50000 CAPSULE ORAL WEEKLY
Qty: 4 CAPSULE | Refills: 5 | Status: SHIPPED | OUTPATIENT
Start: 2021-08-02 | End: 2022-10-13

## 2021-08-02 NOTE — TELEPHONE ENCOUNTER
Medication:   Requested Prescriptions     Pending Prescriptions Disp Refills    vitamin D (ERGOCALCIFEROL) 1.25 MG (67838 UT) CAPS capsule 4 capsule 5     Sig: Take 1 capsule by mouth once a week     Last Filled:  5/17/21    Last appt: 4/5/2021   Next appt: Visit date not found    Last Lipid:   Lab Results   Component Value Date    CHOL 165 04/05/2021    TRIG 88 04/05/2021    HDL 30 04/05/2021    LDLCALC 117 04/05/2021

## 2021-09-08 ENCOUNTER — NURSE ONLY (OUTPATIENT)
Dept: FAMILY MEDICINE CLINIC | Age: 41
End: 2021-09-08
Payer: COMMERCIAL

## 2021-09-08 DIAGNOSIS — Z23 NEED FOR VACCINATION: Primary | ICD-10-CM

## 2021-09-08 PROCEDURE — 90707 MMR VACCINE SC: CPT | Performed by: FAMILY MEDICINE

## 2021-09-08 PROCEDURE — 90471 IMMUNIZATION ADMIN: CPT | Performed by: FAMILY MEDICINE

## 2022-05-02 ENCOUNTER — OFFICE VISIT (OUTPATIENT)
Dept: FAMILY MEDICINE CLINIC | Age: 42
End: 2022-05-02
Payer: COMMERCIAL

## 2022-05-02 VITALS
DIASTOLIC BLOOD PRESSURE: 80 MMHG | HEIGHT: 67 IN | HEART RATE: 96 BPM | BODY MASS INDEX: 27.94 KG/M2 | WEIGHT: 178 LBS | SYSTOLIC BLOOD PRESSURE: 122 MMHG

## 2022-05-02 DIAGNOSIS — Z00.00 ANNUAL PHYSICAL EXAM: Primary | ICD-10-CM

## 2022-05-02 DIAGNOSIS — E55.9 VITAMIN D DEFICIENCY: ICD-10-CM

## 2022-05-02 PROCEDURE — 99396 PREV VISIT EST AGE 40-64: CPT | Performed by: FAMILY MEDICINE

## 2022-05-02 SDOH — ECONOMIC STABILITY: FOOD INSECURITY: WITHIN THE PAST 12 MONTHS, YOU WORRIED THAT YOUR FOOD WOULD RUN OUT BEFORE YOU GOT MONEY TO BUY MORE.: NEVER TRUE

## 2022-05-02 SDOH — ECONOMIC STABILITY: FOOD INSECURITY: WITHIN THE PAST 12 MONTHS, THE FOOD YOU BOUGHT JUST DIDN'T LAST AND YOU DIDN'T HAVE MONEY TO GET MORE.: NEVER TRUE

## 2022-05-02 ASSESSMENT — PATIENT HEALTH QUESTIONNAIRE - PHQ9
SUM OF ALL RESPONSES TO PHQ QUESTIONS 1-9: 0
SUM OF ALL RESPONSES TO PHQ QUESTIONS 1-9: 0
2. FEELING DOWN, DEPRESSED OR HOPELESS: 0
1. LITTLE INTEREST OR PLEASURE IN DOING THINGS: 0
SUM OF ALL RESPONSES TO PHQ QUESTIONS 1-9: 0
SUM OF ALL RESPONSES TO PHQ9 QUESTIONS 1 & 2: 0
SUM OF ALL RESPONSES TO PHQ QUESTIONS 1-9: 0

## 2022-05-02 ASSESSMENT — SOCIAL DETERMINANTS OF HEALTH (SDOH): HOW HARD IS IT FOR YOU TO PAY FOR THE VERY BASICS LIKE FOOD, HOUSING, MEDICAL CARE, AND HEATING?: NOT HARD AT ALL

## 2022-05-02 NOTE — PROGRESS NOTES
Chief Complaint: Annual Exam       HPI: He is here for annual physical exam    Is still continues to have right sensorineural hearing loss. It is associated with tinnitus but is able to cope without any hearing aids. He has been having intermittent pain in his knee joints  X-rays have been normal  It hurts when he is on his feet for longer duration. Denies any injury or swelling      Patient's problem list, medications, allergies, past medical, surgical, social and family histories were reviewed and updated as appropriate. Current Outpatient Medications   Medication Sig Dispense Refill    vitamin D (ERGOCALCIFEROL) 1.25 MG (72171 UT) CAPS capsule Take 1 capsule by mouth once a week 4 capsule 5    Chondroitin Sulfate 150 MG CAPS Take 1 tablet by mouth daily 90 capsule 5     No current facility-administered medications for this visit. Social History     Tobacco Use    Smoking status: Never Smoker    Smokeless tobacco: Never Used   Substance Use Topics    Alcohol use: Never            Review of Systems:  Constitutional: Negative for appetite change, fatigue, fever and unexpected weight change. HENT: Negative for congestion, ear discharge, ear pain, hearing loss, postnasal drip, rhinorrhea, sinus pressure, sore throat and trouble swallowing. Eyes: Negative for photophobia, discharge, redness and visual disturbance. Respiratory: Negative for cough, chest tightness, shortness of breath and wheezing. Cardiovascular: Negative for chest pain, palpitations and leg swelling. Gastrointestinal: Negative for abdominal pain, blood in stool, constipation, diarrhea, nausea and vomiting. Genitourinary: Negative for difficulty urinating, flank pain, frequency, hematuria and urgency. Musculoskeletal: As mentioned above. Skin: Negative for color change, pallor, rash and wound.    Neurological: Negative for dizziness, tremors, seizures, syncope, facial asymmetry, speech difficulty, weakness, light-headedness, numbness and headaches. Psychiatric/Behavioral: Negative for agitation, confusion, self-injury, sleep disturbance and suicidal ideas. The patient is not nervous/anxious. Objective:     Vitals:    05/02/22 0827   BP: 122/80   Site: Right Upper Arm   Position: Sitting   Cuff Size: Medium Adult   Pulse: 96   Weight: 178 lb (80.7 kg)   Height: 5' 7\" (1.702 m)     Body mass index is 27.88 kg/m². Wt Readings from Last 3 Encounters:   05/02/22 178 lb (80.7 kg)   06/11/21 179 lb (81.2 kg)   04/28/21 180 lb (81.6 kg)     BP Readings from Last 3 Encounters:   05/02/22 122/80   06/11/21 120/70   04/28/21 120/72       Physical exam:  Constitutional: he is oriented to person, place, and time. he appears well-developed and well-nourished. No distress. HENT:   Head: Normocephalic. Right Ear: External ear normal. Normal TM reduced hearing on the right  Left Ear: External ear normal. Normal TM  Nose: Nose normal.   Mouth/Throat: Oropharynx is clear and moist. No oropharyngeal exudate. Eyes: Conjunctivae and EOM are normal. Pupils are equal, round, and reactive to light. Right eye exhibits no discharge. Left eye exhibits no discharge. No scleral icterus. Neck: Normal range of motion. No JVD present. No tracheal deviation present. No thyromegaly present. Cardiovascular: Normal rate, regular rhythm, normal heart sounds and intact distal pulses. No murmur heard. Pulmonary/Chest: Effort normal and breath sounds normal. No stridor. No respiratory distress. he has no wheezes. he has no rales. heexhibits no tenderness. Abdominal: Soft. Bowel sounds are normal. he exhibits no distension and no mass. There is no tenderness. There is no rebound and no guarding. Musculoskeletal: Normal bilateral knee exam.   Neurological:he is alert and oriented to person, place, and time. he  has normal reflexes. No cranial nerve deficit.  Coordination normal.   Skin: Lipoma on his right thigh and left forearm  Normal mole on his left thigh  Psychiatric: he has a normal mood and affect. his   behavior is normal.         Health Maintenance   Topic Date Due    HIV screen  Never done    Hepatitis C screen  Never done    Depression Screen  04/05/2022    Lipids  04/05/2026    DTaP/Tdap/Td vaccine (2 - Td or Tdap) 08/25/2031    Flu vaccine  Completed    COVID-19 Vaccine  Completed    Hepatitis A vaccine  Aged Out    Hepatitis B vaccine  Aged Out    Hib vaccine  Aged Out    Meningococcal (ACWY) vaccine  Aged Out    Pneumococcal 0-64 years Vaccine  Aged Out          Assessment/Plan:     1. Annual physical exam  - CBC with Auto Differential; Future  - Basic Metabolic Panel; Future  - Lipid Panel; Future    2. Vitamin D deficiency  We will check for   - Vitamin D 25 Hydroxy; Future       No follow-ups on file.     Garcia Clement MD  5/2/2022 10:07 AM

## 2022-05-04 DIAGNOSIS — Z00.00 ANNUAL PHYSICAL EXAM: ICD-10-CM

## 2022-05-04 DIAGNOSIS — E55.9 VITAMIN D DEFICIENCY: ICD-10-CM

## 2022-05-04 LAB
ANION GAP SERPL CALCULATED.3IONS-SCNC: 13 MMOL/L (ref 3–16)
BASOPHILS ABSOLUTE: 0 K/UL (ref 0–0.2)
BASOPHILS RELATIVE PERCENT: 0.5 %
BUN BLDV-MCNC: 13 MG/DL (ref 7–20)
CALCIUM SERPL-MCNC: 9.4 MG/DL (ref 8.3–10.6)
CHLORIDE BLD-SCNC: 100 MMOL/L (ref 99–110)
CHOLESTEROL, TOTAL: 199 MG/DL (ref 0–199)
CO2: 25 MMOL/L (ref 21–32)
CREAT SERPL-MCNC: 0.7 MG/DL (ref 0.9–1.3)
EOSINOPHILS ABSOLUTE: 0.2 K/UL (ref 0–0.6)
EOSINOPHILS RELATIVE PERCENT: 3.3 %
GFR AFRICAN AMERICAN: >60
GFR NON-AFRICAN AMERICAN: >60
GLUCOSE BLD-MCNC: 96 MG/DL (ref 70–99)
HCT VFR BLD CALC: 39.7 % (ref 40.5–52.5)
HDLC SERPL-MCNC: 32 MG/DL (ref 40–60)
HEMOGLOBIN: 13 G/DL (ref 13.5–17.5)
LDL CHOLESTEROL CALCULATED: 132 MG/DL
LYMPHOCYTES ABSOLUTE: 2.4 K/UL (ref 1–5.1)
LYMPHOCYTES RELATIVE PERCENT: 44.5 %
MCH RBC QN AUTO: 28.2 PG (ref 26–34)
MCHC RBC AUTO-ENTMCNC: 32.7 G/DL (ref 31–36)
MCV RBC AUTO: 86.3 FL (ref 80–100)
MONOCYTES ABSOLUTE: 0.4 K/UL (ref 0–1.3)
MONOCYTES RELATIVE PERCENT: 8 %
NEUTROPHILS ABSOLUTE: 2.4 K/UL (ref 1.7–7.7)
NEUTROPHILS RELATIVE PERCENT: 43.7 %
PDW BLD-RTO: 13.5 % (ref 12.4–15.4)
PLATELET # BLD: 210 K/UL (ref 135–450)
PMV BLD AUTO: 9.1 FL (ref 5–10.5)
POTASSIUM SERPL-SCNC: 4.2 MMOL/L (ref 3.5–5.1)
RBC # BLD: 4.61 M/UL (ref 4.2–5.9)
SODIUM BLD-SCNC: 138 MMOL/L (ref 136–145)
TRIGL SERPL-MCNC: 174 MG/DL (ref 0–150)
VITAMIN D 25-HYDROXY: 27.8 NG/ML
VLDLC SERPL CALC-MCNC: 35 MG/DL
WBC # BLD: 5.4 K/UL (ref 4–11)

## 2022-09-06 ENCOUNTER — OFFICE VISIT (OUTPATIENT)
Dept: FAMILY MEDICINE CLINIC | Age: 42
End: 2022-09-06
Payer: COMMERCIAL

## 2022-09-06 VITALS
SYSTOLIC BLOOD PRESSURE: 122 MMHG | OXYGEN SATURATION: 98 % | TEMPERATURE: 98 F | HEIGHT: 67 IN | BODY MASS INDEX: 29.22 KG/M2 | HEART RATE: 65 BPM | DIASTOLIC BLOOD PRESSURE: 82 MMHG | WEIGHT: 186.2 LBS

## 2022-09-06 DIAGNOSIS — Z01.810 PREOPERATIVE CARDIOVASCULAR EXAMINATION: Primary | ICD-10-CM

## 2022-09-06 PROCEDURE — 1036F TOBACCO NON-USER: CPT | Performed by: FAMILY MEDICINE

## 2022-09-06 PROCEDURE — G8419 CALC BMI OUT NRM PARAM NOF/U: HCPCS | Performed by: FAMILY MEDICINE

## 2022-09-06 PROCEDURE — G8428 CUR MEDS NOT DOCUMENT: HCPCS | Performed by: FAMILY MEDICINE

## 2022-09-06 PROCEDURE — 99213 OFFICE O/P EST LOW 20 MIN: CPT | Performed by: FAMILY MEDICINE

## 2022-09-06 RX ORDER — MELOXICAM 15 MG/1
TABLET ORAL
COMMUNITY
Start: 2022-08-12 | End: 2022-10-13

## 2022-09-06 NOTE — PROGRESS NOTES
Chief complaint: Pre-op Exam (22 MENISCUS TEAR [place])      SUBJECTIVE:  HPI  Nayla Burns (:  1980) is a 38 y.o. male without past medical history who presents with a chief complaint of: needing pre op for right meniscectomy on 22 with Dr. Suze Silva w/ [facility]    Review of Systems:  General: No F/C/NS/fatigue/wt loss   Cardiovascular: No CP  Respiratory: No SOB  GI: No N/V/D/C/abd pain/blood in stool  Neuro: No HA/weakness  Psych: No depressed mood/anxiety  Musculoskeletal: No myalgias    No past medical history on file. Current Outpatient Medications   Medication Sig Dispense Refill    meloxicam (MOBIC) 15 MG tablet NOT TAKING DUE TO SURGERY      Chondroitin Sulfate 150 MG CAPS Take 1 tablet by mouth daily 90 capsule 5    vitamin D (ERGOCALCIFEROL) 1.25 MG (96647 UT) CAPS capsule Take 1 capsule by mouth once a week (Patient not taking: Reported on 2022) 4 capsule 5     No current facility-administered medications for this visit. No Known Allergies  Family History   Problem Relation Age of Onset    Diabetes Mother     Diabetes Father      Past Surgical History:   Procedure Laterality Date    APPENDECTOMY         OBJECTIVE:  /82 (Site: Right Upper Arm, Position: Sitting, Cuff Size: Small Adult)   Pulse 65   Temp 98 °F (36.7 °C) (Oral)   Ht 5' 7\" (1.702 m)   Wt 186 lb 3.2 oz (84.5 kg)   SpO2 98%   BMI 29.16 kg/m²      Physical exam:  afebrile, vitals reviewed  Gen:  WD, WN, NAD, A&Ox3, pleasant  Eyes:  Sclerae clear  Neck:  Supple, No cervical or submandibular LAD. No obvious thyromegaly. Heart:  RRR, no murmur, rubs, gallops  Lungs:  CTAB, no W/R/R  Abd:  soft, NT/ND  Skin: No obvious rashes    ASSESSMENT/PLAN:  1. Preoperative cardiovascular examination    Otherwise healthy male.  Patient has a 0.0% risk of myocardial infarction or cardiac arrest, intraoperatively or up to 30 days post-op per Ariel Martinez perioperative risk calculator.   - Non-emergent surgery  - No active cardiac issues  - Patient does not have: h/o ischemic heart dz, CVA, DM, CKD, CHF  - Patient able to tolerate >4 METS w/o symptoms  - May proceed to surgery from cardiovascular standpoint    Pt verbalized understanding and agrees with plan. Return if symptoms worsen or fail to improve. Electronically signed by Juanito Wen MD on 9/6/2022 at 5:21 PM.     Please note, portions of this note were completed with a voice recognition program.  Although every effort was made to ensure the accuracy of this automated transcription, some errors in transcription may have occurred.

## 2022-10-12 ENCOUNTER — TELEPHONE (OUTPATIENT)
Dept: FAMILY MEDICINE CLINIC | Age: 42
End: 2022-10-12

## 2022-10-12 NOTE — TELEPHONE ENCOUNTER
Patient has been experiencing unbearable stomach pain when he lays down to go to sleep. He scheduled an appointment for tomorrow (10/13/2022), but would like to know if there's anything he can be prescribed to help ease the pain before bed today. Please advise.

## 2022-10-12 NOTE — TELEPHONE ENCOUNTER
Unsure of the cause of the pain so please advise him if the pain is unbearable to come in at 9 am tomorrow and double book.   If not go to urgent care

## 2022-10-13 ENCOUNTER — OFFICE VISIT (OUTPATIENT)
Dept: FAMILY MEDICINE CLINIC | Age: 42
End: 2022-10-13
Payer: COMMERCIAL

## 2022-10-13 VITALS
HEIGHT: 67 IN | DIASTOLIC BLOOD PRESSURE: 89 MMHG | SYSTOLIC BLOOD PRESSURE: 138 MMHG | WEIGHT: 188.6 LBS | HEART RATE: 76 BPM | OXYGEN SATURATION: 99 % | TEMPERATURE: 96.8 F | BODY MASS INDEX: 29.6 KG/M2 | RESPIRATION RATE: 16 BRPM

## 2022-10-13 DIAGNOSIS — K29.00 ACUTE GASTRITIS WITHOUT HEMORRHAGE, UNSPECIFIED GASTRITIS TYPE: ICD-10-CM

## 2022-10-13 DIAGNOSIS — R10.13 EPIGASTRIC PAIN: Primary | ICD-10-CM

## 2022-10-13 DIAGNOSIS — D64.9 ANEMIA, UNSPECIFIED TYPE: ICD-10-CM

## 2022-10-13 LAB
A/G RATIO: 2.1 (ref 1.1–2.2)
ALBUMIN SERPL-MCNC: 4.9 G/DL (ref 3.4–5)
ALP BLD-CCNC: 76 U/L (ref 40–129)
ALT SERPL-CCNC: 16 U/L (ref 10–40)
ANION GAP SERPL CALCULATED.3IONS-SCNC: 9 MMOL/L (ref 3–16)
AST SERPL-CCNC: 15 U/L (ref 15–37)
BASOPHILS ABSOLUTE: 0.1 K/UL (ref 0–0.2)
BASOPHILS RELATIVE PERCENT: 0.8 %
BILIRUB SERPL-MCNC: 0.3 MG/DL (ref 0–1)
BUN BLDV-MCNC: 12 MG/DL (ref 7–20)
CALCIUM SERPL-MCNC: 9.2 MG/DL (ref 8.3–10.6)
CHLORIDE BLD-SCNC: 99 MMOL/L (ref 99–110)
CO2: 29 MMOL/L (ref 21–32)
CREAT SERPL-MCNC: 0.8 MG/DL (ref 0.9–1.3)
EOSINOPHILS ABSOLUTE: 0.3 K/UL (ref 0–0.6)
EOSINOPHILS RELATIVE PERCENT: 4.3 %
GFR AFRICAN AMERICAN: >60
GFR NON-AFRICAN AMERICAN: >60
GLUCOSE BLD-MCNC: 95 MG/DL (ref 70–99)
HCT VFR BLD CALC: 38.3 % (ref 40.5–52.5)
HEMOGLOBIN: 13 G/DL (ref 13.5–17.5)
LIPASE: 59 U/L (ref 13–60)
LYMPHOCYTES ABSOLUTE: 2.9 K/UL (ref 1–5.1)
LYMPHOCYTES RELATIVE PERCENT: 46.3 %
MCH RBC QN AUTO: 28.8 PG (ref 26–34)
MCHC RBC AUTO-ENTMCNC: 34.1 G/DL (ref 31–36)
MCV RBC AUTO: 84.4 FL (ref 80–100)
MONOCYTES ABSOLUTE: 0.5 K/UL (ref 0–1.3)
MONOCYTES RELATIVE PERCENT: 7.4 %
NEUTROPHILS ABSOLUTE: 2.6 K/UL (ref 1.7–7.7)
NEUTROPHILS RELATIVE PERCENT: 41.2 %
PDW BLD-RTO: 13.9 % (ref 12.4–15.4)
PLATELET # BLD: 220 K/UL (ref 135–450)
PMV BLD AUTO: 9.8 FL (ref 5–10.5)
POTASSIUM SERPL-SCNC: 4.1 MMOL/L (ref 3.5–5.1)
RBC # BLD: 4.54 M/UL (ref 4.2–5.9)
SODIUM BLD-SCNC: 137 MMOL/L (ref 136–145)
TOTAL PROTEIN: 7.2 G/DL (ref 6.4–8.2)
WBC # BLD: 6.3 K/UL (ref 4–11)

## 2022-10-13 PROCEDURE — G8484 FLU IMMUNIZE NO ADMIN: HCPCS | Performed by: FAMILY MEDICINE

## 2022-10-13 PROCEDURE — G8427 DOCREV CUR MEDS BY ELIG CLIN: HCPCS | Performed by: FAMILY MEDICINE

## 2022-10-13 PROCEDURE — 99213 OFFICE O/P EST LOW 20 MIN: CPT | Performed by: FAMILY MEDICINE

## 2022-10-13 PROCEDURE — 1036F TOBACCO NON-USER: CPT | Performed by: FAMILY MEDICINE

## 2022-10-13 PROCEDURE — G8419 CALC BMI OUT NRM PARAM NOF/U: HCPCS | Performed by: FAMILY MEDICINE

## 2022-10-13 RX ORDER — SUCRALFATE 1 G/1
1 TABLET ORAL 4 TIMES DAILY
Qty: 56 TABLET | Refills: 0 | Status: SHIPPED | OUTPATIENT
Start: 2022-10-13 | End: 2022-10-27

## 2022-10-13 RX ORDER — PANTOPRAZOLE SODIUM 40 MG/1
40 TABLET, DELAYED RELEASE ORAL 2 TIMES DAILY
Qty: 28 TABLET | Refills: 0 | Status: SHIPPED | OUTPATIENT
Start: 2022-10-13 | End: 2022-10-27

## 2022-10-13 NOTE — TELEPHONE ENCOUNTER
10/13/2022 - 10/12/2024         Date Visit Type Department Provider    10/13/2022  1:00 PM [facility]   Appointment Notes:    stomach pain when laying down

## 2022-10-13 NOTE — PROGRESS NOTES
Chief Complaint: ED Follow-up (Urgent Care follow up; 10/11/2022 went to [facility] in [place] for abdominal pain; Urgent Care said they thought it was related to gas but pt does not believe so, he reports it's very painful ) and Abdominal Pain (right lower quadrant pain (radiates around entire abdomen), tender with palpitation and lying supine; onset 4 days )       HPI:  Zayra Horner is a 38 y.o. male here with c/o pain in his epigastric region going to his back since 4 days. Prior to this he had been to drink alcohol. He usually does not. Denies any symptoms of acid reflux. The pain is worse when he lies down flat. He feels bloated. Denies any nausea or vomiting    ROS:  Constitutional: Negative  HENT: Negative   Eyes: Negative for photophobia, discharge, redness and visual disturbance. Respiratory: Negative for cough, chest tightness, shortness of breath and wheezing. Cardiovascular: Negative for chest pain, palpitations and leg swelling. Gastrointestinal: As mentioned above    Patient's problem list, medications, allergies, past medical, surgical, social and family histories were reviewed and updated as appropriate. Current Outpatient Medications   Medication Sig Dispense Refill    sucralfate (CARAFATE) 1 GM tablet Take 1 tablet by mouth 4 times daily for 14 days 56 tablet 0    pantoprazole (PROTONIX) 40 MG tablet Take 1 tablet by mouth in the morning and at bedtime for 14 days 28 tablet 0     No current facility-administered medications for this visit.        Social History     Tobacco Use    Smoking status: Never    Smokeless tobacco: Never   Substance Use Topics    Alcohol use: Never        Objective:     Vitals:    10/13/22 1330 10/13/22 1337   BP: (!) 138/94 138/89   Site: Left Upper Arm Left Upper Arm   Position: Sitting Sitting   Cuff Size: Medium Adult Medium Adult   Pulse: 76    Resp: 16    Temp: 96.8 °F (36 °C)    TempSrc: Temporal    SpO2: 99%    Weight: 188 lb 9.6 oz (85.5 kg)    Height: 5' 7\" (1.702 m)      Body mass index is 29.54 kg/m². Wt Readings from Last 3 Encounters:   10/13/22 188 lb 9.6 oz (85.5 kg)   09/06/22 186 lb 3.2 oz (84.5 kg)   05/02/22 178 lb (80.7 kg)     BP Readings from Last 3 Encounters:   10/13/22 138/89   09/06/22 122/82   05/02/22 122/80       Physical exam:  Constitutional: he is oriented to person, place, and time. he appears well-developed and well-nourished. No distress. Neck: Normal range of motion. No JVD present. No tracheal deviation present. No thyromegaly present. Cardiovascular: Normal rate, regular rhythm, normal heart sounds and intact distal pulses. No murmur heard. Pulmonary/Chest: Effort normal and breath sounds normal. No stridor. No respiratory distress. he has no wheezes. he has no rales. heexhibits no tenderness. Abdominal: Soft. Bowel sounds are normal. he exhibits no distension and no mass. There is no tenderness. There is no rebound and no guarding. Assessment/Plan:   1. Epigastric pain  Could be due to gastritis versus pancreatitis  We will get the blood work  Advised on the bland diet  - Comprehensive Metabolic Panel; Future  - sucralfate (CARAFATE) 1 GM tablet; Take 1 tablet by mouth 4 times daily for 14 days  Dispense: 56 tablet; Refill: 0  - pantoprazole (PROTONIX) 40 MG tablet; Take 1 tablet by mouth in the morning and at bedtime for 14 days  Dispense: 28 tablet; Refill: 0  - Lipase;  Future  - CBC with Auto Differential; Future     Jenney Sicard, MD  10/13/2022 2:13 PM

## 2022-12-28 DIAGNOSIS — R10.13 EPIGASTRIC PAIN: ICD-10-CM

## 2022-12-28 RX ORDER — SUCRALFATE 1 G/1
1 TABLET ORAL 4 TIMES DAILY
Qty: 56 TABLET | Refills: 0 | Status: SHIPPED | OUTPATIENT
Start: 2022-12-28 | End: 2023-01-11

## 2022-12-28 NOTE — TELEPHONE ENCOUNTER
Medication:   Requested Prescriptions     Pending Prescriptions Disp Refills    sucralfate (CARAFATE) 1 GM tablet 56 tablet 0     Sig: Take 1 tablet by mouth 4 times daily for 14 days        Last Filled:      Patient Phone Number: [phone] (home) [phone] (work)    Last appt: 10/13/2022   Next appt: Visit date not found    Last OARRS: No flowsheet data found. Yes

## 2024-02-16 ENCOUNTER — OFFICE VISIT (OUTPATIENT)
Dept: FAMILY MEDICINE CLINIC | Age: 44
End: 2024-02-16

## 2024-02-16 VITALS
DIASTOLIC BLOOD PRESSURE: 84 MMHG | WEIGHT: 189 LBS | BODY MASS INDEX: 29.66 KG/M2 | HEART RATE: 62 BPM | SYSTOLIC BLOOD PRESSURE: 138 MMHG | HEIGHT: 67 IN | OXYGEN SATURATION: 98 %

## 2024-02-16 DIAGNOSIS — G47.33 OBSTRUCTIVE SLEEP APNEA SYNDROME: Primary | ICD-10-CM

## 2024-02-16 DIAGNOSIS — M25.50 MULTIPLE JOINT PAIN: ICD-10-CM

## 2024-02-16 DIAGNOSIS — Z12.11 SCREENING FOR COLON CANCER: ICD-10-CM

## 2024-02-16 DIAGNOSIS — Z00.00 ANNUAL PHYSICAL EXAM: ICD-10-CM

## 2024-02-16 NOTE — PROGRESS NOTES
Meningococcal (ACWY) vaccine  Aged Out    Pneumococcal 0-64 years Vaccine  Aged Out          Assessment/Plan:     1. Obstructive sleep apnea syndrome  - [facility] - Tesfaye Morris MD, Pulmonary, [facility]    2. Screening for colon cancer  - [facility] - Benjamín Gabriel MD, Gastroenterology, [facility]    3. Annual physical exam  - CBC with Auto Differential; Future  - Comprehensive Metabolic Panel; Future  - Lipid Panel; Future  -PSA    4 multiple joint aches  - MICHAEL; Future  - CK; Future  - C-Reactive Protein; Future  - Cyclic Citrul Peptide Antibody, IgG; Future  - Sedimentation Rate; Future  - Rheumatoid Factor; Future  - TSH with Reflex; Future  - Vitamin D 25 Hydroxy; Future  - ANTI-DNA ANTIBODY, DOUBLE-STRANDED; Future         No follow-ups on file.    Priti Zavala MD  2/16/2024 3:58 PM

## 2024-02-17 DIAGNOSIS — Z00.00 ANNUAL PHYSICAL EXAM: ICD-10-CM

## 2024-02-17 LAB
25(OH)D3 SERPL-MCNC: 16.4 NG/ML
ALBUMIN SERPL-MCNC: 4.8 G/DL (ref 3.4–5)
ALBUMIN/GLOB SERPL: 2 {RATIO} (ref 1.1–2.2)
ALP SERPL-CCNC: 75 U/L (ref 40–129)
ALT SERPL-CCNC: 14 U/L (ref 10–40)
ANION GAP SERPL CALCULATED.3IONS-SCNC: 6 MMOL/L (ref 3–16)
AST SERPL-CCNC: 14 U/L (ref 15–37)
BASOPHILS # BLD: 0.1 K/UL (ref 0–0.2)
BASOPHILS NFR BLD: 0.8 %
BILIRUB SERPL-MCNC: 0.4 MG/DL (ref 0–1)
BUN SERPL-MCNC: 13 MG/DL (ref 7–20)
CALCIUM SERPL-MCNC: 8.7 MG/DL (ref 8.3–10.6)
CHLORIDE SERPL-SCNC: 100 MMOL/L (ref 99–110)
CK SERPL-CCNC: 95 U/L (ref 39–308)
CO2 SERPL-SCNC: 30 MMOL/L (ref 21–32)
CREAT SERPL-MCNC: 0.9 MG/DL (ref 0.9–1.3)
CRP SERPL-MCNC: <3 MG/L (ref 0–5.1)
DEPRECATED RDW RBC AUTO: 13.3 % (ref 12.4–15.4)
EOSINOPHIL # BLD: 0.1 K/UL (ref 0–0.6)
EOSINOPHIL NFR BLD: 2.1 %
ERYTHROCYTE [SEDIMENTATION RATE] IN BLOOD BY WESTERGREN METHOD: 4 MM/HR (ref 0–15)
GFR SERPLBLD CREATININE-BSD FMLA CKD-EPI: >60 ML/MIN/{1.73_M2}
GLUCOSE SERPL-MCNC: 104 MG/DL (ref 70–99)
HCT VFR BLD AUTO: 40.6 % (ref 40.5–52.5)
HGB BLD-MCNC: 13.7 G/DL (ref 13.5–17.5)
LYMPHOCYTES # BLD: 3.1 K/UL (ref 1–5.1)
LYMPHOCYTES NFR BLD: 44.1 %
MCH RBC QN AUTO: 28.6 PG (ref 26–34)
MCHC RBC AUTO-ENTMCNC: 33.6 G/DL (ref 31–36)
MCV RBC AUTO: 84.9 FL (ref 80–100)
MONOCYTES # BLD: 0.5 K/UL (ref 0–1.3)
MONOCYTES NFR BLD: 6.8 %
NEUTROPHILS # BLD: 3.2 K/UL (ref 1.7–7.7)
NEUTROPHILS NFR BLD: 46.2 %
PLATELET # BLD AUTO: 214 K/UL (ref 135–450)
PMV BLD AUTO: 9.9 FL (ref 5–10.5)
POTASSIUM SERPL-SCNC: 4.2 MMOL/L (ref 3.5–5.1)
PROT SERPL-MCNC: 7.2 G/DL (ref 6.4–8.2)
PSA SERPL DL<=0.01 NG/ML-MCNC: 1.04 NG/ML (ref 0–4)
RBC # BLD AUTO: 4.79 M/UL (ref 4.2–5.9)
RHEUMATOID FACT SER IA-ACNC: <10 IU/ML
SODIUM SERPL-SCNC: 136 MMOL/L (ref 136–145)
TSH SERPL DL<=0.005 MIU/L-ACNC: 1.98 UIU/ML (ref 0.27–4.2)
WBC # BLD AUTO: 7 K/UL (ref 4–11)

## 2024-02-18 LAB
ANA SER QL IA: NEGATIVE
CCP IGG SERPL-ACNC: <0.5 U/ML (ref 0–2.9)
DSDNA AB SER-ACNC: <1 IU/ML (ref 0–9)

## 2024-02-19 LAB
CHOLEST SERPL-MCNC: 180 MG/DL (ref 0–199)
HDLC SERPL-MCNC: 30 MG/DL (ref 40–60)
LDLC SERPL CALC-MCNC: 117 MG/DL
TRIGL SERPL-MCNC: 167 MG/DL (ref 0–150)
VLDLC SERPL CALC-MCNC: 33 MG/DL

## 2024-02-19 RX ORDER — ERGOCALCIFEROL 1.25 MG/1
50000 CAPSULE ORAL WEEKLY
Qty: 4 CAPSULE | Refills: 5 | Status: SHIPPED | OUTPATIENT
Start: 2024-02-19

## 2024-03-14 RX ORDER — ERGOCALCIFEROL 1.25 MG/1
50000 CAPSULE ORAL WEEKLY
Qty: 4 CAPSULE | Refills: 5 | Status: SHIPPED | OUTPATIENT
Start: 2024-03-14

## 2024-03-14 NOTE — TELEPHONE ENCOUNTER
Medication:   Requested Prescriptions     Pending Prescriptions Disp Refills    vitamin D (ERGOCALCIFEROL) 1.25 MG (93998 UT) CAPS capsule 4 capsule 5     Sig: Take 1 capsule by mouth once a week        Last Filled:  02/19/2024 #4 5rf    Patient Phone Number: [phone] (home) [phone] (work)    Last appt: 2/16/2024   Next appt: Visit date not found    Last OARRS:        No data to display

## 2025-02-13 ENCOUNTER — OFFICE VISIT (OUTPATIENT)
Age: 45
End: 2025-02-13
Payer: COMMERCIAL

## 2025-02-13 VITALS — WEIGHT: 183 LBS | BODY MASS INDEX: 28.72 KG/M2 | HEIGHT: 67 IN

## 2025-02-13 DIAGNOSIS — M25.562 LEFT KNEE PAIN, UNSPECIFIED CHRONICITY: ICD-10-CM

## 2025-02-13 DIAGNOSIS — M23.42 LOOSE BODY OF LEFT KNEE: Primary | ICD-10-CM

## 2025-02-13 PROCEDURE — 99203 OFFICE O/P NEW LOW 30 MIN: CPT | Performed by: ORTHOPAEDIC SURGERY

## 2025-02-13 SDOH — HEALTH STABILITY: PHYSICAL HEALTH: ON AVERAGE, HOW MANY MINUTES DO YOU ENGAGE IN EXERCISE AT THIS LEVEL?: 60 MIN

## 2025-02-13 SDOH — HEALTH STABILITY: PHYSICAL HEALTH: ON AVERAGE, HOW MANY DAYS PER WEEK DO YOU ENGAGE IN MODERATE TO STRENUOUS EXERCISE (LIKE A BRISK WALK)?: 2 DAYS

## 2025-02-13 NOTE — PROGRESS NOTES
Date of Encounter: 2/13/2025  Patient Name:Dior Mullen  Medical Record Number: [id number]    Chief Complaint   Patient presents with    New Patient     Left knee pain, has been hurting for a couple of years but he injured it playing volleyball on Monday. He has hx of meniscus repair on the right knee       History of Present Illness:  Dior Mullen is a 44 y.o. male here for evaluation of his left knee.  His current symptoms are described below and I reviewed them with him today.   History of Present Illness  The patient presents for evaluation of left knee pain.    He reports experiencing bilateral knee pain, with the onset of worsening left knee pain occurring during a volleyball game on Monday night. He also notes the presence of swelling in the left knee after the activity, which has since improved. Initially, he was unable to flex his knee, but he has regained some flexibility, allowing for approximately 100 degrees of bending. He describes a sensation of a ball-like structure protruding from his knee, which he manually repositions while walking with pressure at the suprapatellar pouch. Despite these symptoms, he retains the ability to ambulate, although with a limp. He underwent arthroscopic surgery on his right knee 3 years ago. An MRI scan conducted at that time revealed meniscal tears in both knees. Post-surgery, he experienced numbness lateral to his portal sites in the right knee, a symptom that persists to this day.    Pain Assessment  Location of Pain: Knee  Location Modifiers: Left  Severity of Pain: 4  Quality of Pain: Aching  Duration of Pain: A few hours  Frequency of Pain: Several times daily  Aggravating Factors: Walking  Limiting Behavior: Yes  Relieving Factors: Ice  Result of Injury: Yes  Work-Related Injury: No  Are there other pain locations you wish to document?: No    History reviewed. No pertinent past medical history.    Past Surgical History:   Procedure Laterality Date

## 2025-02-20 ENCOUNTER — HOSPITAL ENCOUNTER (OUTPATIENT)
Age: 45
Discharge: HOME | End: 2025-02-20
Attending: ORTHOPAEDIC SURGERY
Payer: COMMERCIAL

## 2025-02-20 DIAGNOSIS — M23.42 LOOSE BODY OF LEFT KNEE: ICD-10-CM

## 2025-02-20 PROCEDURE — 73721 MRI JNT OF LWR EXTRE W/O DYE: CPT

## 2025-02-27 ENCOUNTER — TELEPHONE (OUTPATIENT)
Age: 45
End: 2025-02-27

## 2025-02-27 NOTE — TELEPHONE ENCOUNTER
Called patient and spoke with patient about gettting in to see Dr. Parra about Mri results and disusucc surgry we have made him an appointment to see Dr. Parra next wednesday at 2:45pm

## 2025-03-05 ENCOUNTER — OFFICE VISIT (OUTPATIENT)
Age: 45
End: 2025-03-05

## 2025-03-05 VITALS — HEIGHT: 67 IN | WEIGHT: 183 LBS | BODY MASS INDEX: 28.72 KG/M2

## 2025-03-05 DIAGNOSIS — M23.42 LOOSE BODY OF LEFT KNEE: Primary | ICD-10-CM

## 2025-03-05 DIAGNOSIS — S83.242D TEAR OF MEDIAL MENISCUS OF LEFT KNEE, UNSPECIFIED TEAR TYPE, UNSPECIFIED WHETHER OLD OR CURRENT TEAR, SUBSEQUENT ENCOUNTER: ICD-10-CM

## 2025-03-13 ENCOUNTER — TELEPHONE (OUTPATIENT)
Age: 45
End: 2025-03-13

## 2025-03-13 NOTE — TELEPHONE ENCOUNTER
Patient had appt with Dr. Parra 3/5 and would like to discuss surgery     Call back number is [phone]

## 2025-03-14 ENCOUNTER — PREP FOR PROCEDURE (OUTPATIENT)
Age: 45
End: 2025-03-14

## 2025-03-14 DIAGNOSIS — S83.242D OTHER TEAR OF MEDIAL MENISCUS, CURRENT INJURY, LEFT KNEE, SUBSEQUENT ENCOUNTER: ICD-10-CM

## 2025-03-14 DIAGNOSIS — M23.42 LOOSE BODY OF LEFT KNEE: ICD-10-CM

## 2025-03-14 DIAGNOSIS — Z01.818 PRE-OP TESTING: Primary | ICD-10-CM

## 2025-03-14 RX ORDER — SODIUM CHLORIDE 0.9 % (FLUSH) 0.9 %
5-40 SYRINGE (ML) INJECTION EVERY 12 HOURS SCHEDULED
Status: CANCELLED | OUTPATIENT
Start: 2025-03-14

## 2025-03-14 RX ORDER — ACETAMINOPHEN 325 MG/1
1000 TABLET ORAL ONCE
Status: CANCELLED | OUTPATIENT
Start: 2025-03-14 | End: 2025-03-14

## 2025-03-14 RX ORDER — OMEGA-3-ACID ETHYL ESTERS 1 G/1
2 CAPSULE, LIQUID FILLED ORAL 2 TIMES DAILY WITH MEALS
COMMUNITY
Start: 2025-03-12

## 2025-03-14 RX ORDER — MELOXICAM 7.5 MG/1
7.5 TABLET ORAL ONCE
Status: CANCELLED | OUTPATIENT
Start: 2025-03-14 | End: 2025-03-14

## 2025-03-14 RX ORDER — SODIUM CHLORIDE 9 MG/ML
INJECTION, SOLUTION INTRAVENOUS CONTINUOUS
Status: CANCELLED | OUTPATIENT
Start: 2025-03-14

## 2025-03-14 RX ORDER — TRANEXAMIC ACID 650 MG/1
1950 TABLET ORAL
Status: CANCELLED | OUTPATIENT
Start: 2025-03-14

## 2025-03-14 RX ORDER — SODIUM CHLORIDE 0.9 % (FLUSH) 0.9 %
5-40 SYRINGE (ML) INJECTION PRN
Status: CANCELLED | OUTPATIENT
Start: 2025-03-14

## 2025-03-14 RX ORDER — SODIUM CHLORIDE 9 MG/ML
INJECTION, SOLUTION INTRAVENOUS PRN
Status: CANCELLED | OUTPATIENT
Start: 2025-03-14

## 2025-03-14 RX ORDER — ACETAMINOPHEN 500 MG
500 TABLET ORAL EVERY 6 HOURS PRN
COMMUNITY

## 2025-03-14 NOTE — PROGRESS NOTES
FOCUS NOTES:    DOS: 3/26/25   Surgeon: Fidel      Date:  Follow up  Comment       PAT Initials  3/14/25 PAT Call complete. Pt states he plans to call to schedule Pre-op H+P with PCP, Dr. Mera Flores ([facility]), states PCP plans to complete Pre-op EKG also. Pre-op Labs completed 3/11 @ [facility], see Care Everywhere. Msg sent to Dr. Parra's Office 3/14 to verify they have been reviewed. EBA   3/19/25 To see PCP 3/20/25 for H&P and EKG. NT  3/21/25 Pre-op H+P completed 3/20 with PCP, Dr. Mera Flores. EKG completed 3/20 @ Pre-op H+P appt? Do not see result noted or tracing. Called Dr. Flores's Office 3/21 for EKG tracing to be faxed to PAT. BEA   3/21/25 Received return call from Dr. Flores's Office 3/21 ([place] [phone] Opt 2), state EKG not done b/c paperwork rcvd from [facility] stated only needs to be done on Pt's >50. Msg sent to Dr. Parra's Office to verify no EKG needed. BEA   3/21/25 Rcvd return msg from Dr. Parra's Office, no EKG needed. BEA

## 2025-03-14 NOTE — PROGRESS NOTES
[facility] PRE-OPERATIVE INSTRUCTIONS       DOS: __3/26/25__        Pre-Op Instructions     Patients receiving local anesthetic only will arrive one hour prior to the procedure, all other patients will arrive 1.5 hours prior to procedure time.    [x]  A History and Physical will be required within 30 days prior to surgery date. Some patients may require cardiac or pulmonary clearance. H&P will be completed DOS for Endo/colonoscopy patients.     [x]  Reviewed Medical and Surgical history, medication list, confirmed with patient any implants, allergies, bleeding disorders, ALPESH and reactions to Anesthesia.    [x]  If there is a change in physical condition between now and the day of surgery, please notify your surgeon. This includes a cough, cold, fever, sore throat, nausea, vomiting and diarrhea. Also notify your surgeon if you experience dizziness, shortness of breath or blurred vision.    [x] Reviewed hx of C-Diff, MRSA, VRE and/or recent use of Antibiotics     [x]  All patients having a procedure must have a ride home by a responsible person that is over the age of 18 and ensure it is someone that we can share medical information with. After discharge, a responsible adult needs to stay with you for 24 hours. There is a limit of 2 adult visitors per room.     If unable to secure ride and/or care taker, please contact surgeon's office.      [x]  No alcohol, smoking or marijuana use 24 hours prior to surgery. Any use of recreational drugs must be stopped 5 days prior to surgery.     [x]  NPO after midnight (Any heart, BP, seizure, thyroid and breathing medications are okay to take the morning of surgery with a small sip of water 4 hours prior to procedure).    The morning of surgery, you may brush your teeth, just no swallowing water. Also, NO gum, candy, mints or ice chips.    []  For Colonoscopy's, follow prep-instructions as indicated by physician.     []  Patients with a insulin pump, keep set on

## 2025-03-14 NOTE — TELEPHONE ENCOUNTER
Called patient, Spoke with patient about scheduling surgery for their LEFT KNEE SCOPE. We have discussed the surgery, dates, times, what to expect and outcomes. We have decided to schedule for 3/26/25 with intent for H&P, labs and other clearances to be completed well before surgery date. We have also scheduled their first PO date of 4/7/25. Patient verbally understands all information and will call or give us a MyChart message if they need more information.

## 2025-03-25 ENCOUNTER — TELEPHONE (OUTPATIENT)
Age: 45
End: 2025-03-25

## 2025-03-25 ENCOUNTER — ANESTHESIA EVENT (OUTPATIENT)
Age: 45
End: 2025-03-25
Payer: COMMERCIAL

## 2025-03-25 NOTE — DISCHARGE INSTRUCTIONS
Outpatient Post-Operative Discharge Instructions for Arthroscopic Meniscus Repair  Call your surgeon’s office today to schedule your follow-up appointment if not scheduled already: [phone]. See Dr. Parra in 10-14 days.  Resume your normal activities as you begin to feel better, unless otherwise instructed by your physician.  Walking restrictions: Other: Touch-down flat foot weight bearing as tolerated with crutches.    Otherwise wear brace locked in extension and may be touch-down (with foot flat, NOT toe-touch) WB with crutches.  May unlock brace to sit and bend up to 90 degrees as tolerated.   Driving restrictions:  Other:  No driving for 2-3 days see #5.  You must have enough strength and motion in the knee to drive and this takes time to recover.  Do not drive while taking pain medication or until 24 hours after anesthesia.  Diet instructions: Start with clear liquids, progress to a light diet, then to a normal diet as tolerated. Take only clear liquids if nauseated or vomiting.  Avoid alcoholic beverages and major decision making for 24 hours after anesthesia.  Notify your physician if you have:  Excessive bleeding, drainage, redness, or other problems at the surgical site.  Persistent nausea, vomiting, or diarrhea  Severe pain after taking prescribed pain medication  Hives, rash, or itching.  Numbness or tingling, increased pain, or bluish, white, cool extremities around a cast, ace bandage or dressing.  Temperature over 100 degrees F.  If you cannot reach your physician for any concern, please go with this paper to an emergency facility nearest you.  Medication instructions:  o Prescription given  o Medication reconciliation sheet given to patient  Special Instructions:                 Ice packs to kneeFrequency: 30 minutes every 2 hours for 72 hours  Elevate knee above level of heart for: 72 hours  Keep bandage clean  Leave bandage on and intact until: three days after surgery, then may remove, but  keep steri-strips in place.  May shower over steri-strips starting three days after surgery  Move ankle and foot every hour while awake, perform quad squeeze and glute squeeze exercises every hour while awake.        I have received and reviewed these instructions with the nurse and I understand them. If I have any problems or questions at a later time, I will call my physician.   Patient Signature__________________________________Relationship:__________________    Nurse Review orders:___________________________________  Date:_____________

## 2025-03-26 ENCOUNTER — ANESTHESIA (OUTPATIENT)
Age: 45
End: 2025-03-26
Payer: COMMERCIAL

## 2025-03-26 ENCOUNTER — HOSPITAL ENCOUNTER (OUTPATIENT)
Age: 45
Setting detail: OUTPATIENT SURGERY
Discharge: HOME | End: 2025-03-26
Attending: ORTHOPAEDIC SURGERY | Admitting: ORTHOPAEDIC SURGERY
Payer: COMMERCIAL

## 2025-03-26 VITALS
HEART RATE: 79 BPM | WEIGHT: 185 LBS | DIASTOLIC BLOOD PRESSURE: 92 MMHG | BODY MASS INDEX: 29.03 KG/M2 | OXYGEN SATURATION: 95 % | RESPIRATION RATE: 16 BRPM | TEMPERATURE: 97.7 F | HEIGHT: 67 IN | SYSTOLIC BLOOD PRESSURE: 148 MMHG

## 2025-03-26 DIAGNOSIS — M23.42 LOOSE BODY OF LEFT KNEE: ICD-10-CM

## 2025-03-26 DIAGNOSIS — S83.242D OTHER TEAR OF MEDIAL MENISCUS, CURRENT INJURY, LEFT KNEE, SUBSEQUENT ENCOUNTER: ICD-10-CM

## 2025-03-26 PROBLEM — M25.862 MASS OF JOINT OF LEFT KNEE: Status: ACTIVE | Noted: 2025-03-14

## 2025-03-26 PROCEDURE — 2500000003 HC RX 250 WO HCPCS

## 2025-03-26 PROCEDURE — 7100000000 HC PACU RECOVERY - FIRST 15 MIN: Performed by: ORTHOPAEDIC SURGERY

## 2025-03-26 PROCEDURE — 3700000000 HC ANESTHESIA ATTENDED CARE: Performed by: ORTHOPAEDIC SURGERY

## 2025-03-26 PROCEDURE — 88305 TISSUE EXAM BY PATHOLOGIST: CPT

## 2025-03-26 PROCEDURE — 3700000001 HC ADD 15 MINUTES (ANESTHESIA): Performed by: ORTHOPAEDIC SURGERY

## 2025-03-26 PROCEDURE — 6360000002 HC RX W HCPCS

## 2025-03-26 PROCEDURE — 2709999900 HC NON-CHARGEABLE SUPPLY: Performed by: ORTHOPAEDIC SURGERY

## 2025-03-26 PROCEDURE — 2580000003 HC RX 258: Performed by: ORTHOPAEDIC SURGERY

## 2025-03-26 PROCEDURE — 6360000002 HC RX W HCPCS: Performed by: ORTHOPAEDIC SURGERY

## 2025-03-26 PROCEDURE — 7100000011 HC PHASE II RECOVERY - ADDTL 15 MIN: Performed by: ORTHOPAEDIC SURGERY

## 2025-03-26 PROCEDURE — 2500000003 HC RX 250 WO HCPCS: Performed by: ORTHOPAEDIC SURGERY

## 2025-03-26 PROCEDURE — 3600000014 HC SURGERY LEVEL 4 ADDTL 15MIN: Performed by: ORTHOPAEDIC SURGERY

## 2025-03-26 PROCEDURE — 7100000010 HC PHASE II RECOVERY - FIRST 15 MIN: Performed by: ORTHOPAEDIC SURGERY

## 2025-03-26 PROCEDURE — 6370000000 HC RX 637 (ALT 250 FOR IP): Performed by: ORTHOPAEDIC SURGERY

## 2025-03-26 PROCEDURE — 2580000003 HC RX 258

## 2025-03-26 PROCEDURE — 3600000004 HC SURGERY LEVEL 4 BASE: Performed by: ORTHOPAEDIC SURGERY

## 2025-03-26 PROCEDURE — C1713 ANCHOR/SCREW BN/BN,TIS/BN: HCPCS | Performed by: ORTHOPAEDIC SURGERY

## 2025-03-26 PROCEDURE — 7100000001 HC PACU RECOVERY - ADDTL 15 MIN: Performed by: ORTHOPAEDIC SURGERY

## 2025-03-26 PROCEDURE — 6360000002 HC RX W HCPCS: Performed by: ANESTHESIOLOGY

## 2025-03-26 PROCEDURE — 2720000010 HC SURG SUPPLY STERILE: Performed by: ORTHOPAEDIC SURGERY

## 2025-03-26 PROCEDURE — 6370000000 HC RX 637 (ALT 250 FOR IP): Performed by: ANESTHESIOLOGY

## 2025-03-26 DEVICE — ANCHOR SUTURE 12 DEG DIA1.5 MM SUTURE 2-0 ARTHSCP UP CRV: Type: IMPLANTABLE DEVICE | Site: KNEE | Status: FUNCTIONAL

## 2025-03-26 RX ORDER — BUPIVACAINE HYDROCHLORIDE 2.5 MG/ML
INJECTION, SOLUTION EPIDURAL; INFILTRATION; INTRACAUDAL; PERINEURAL PRN
Status: DISCONTINUED | OUTPATIENT
Start: 2025-03-26 | End: 2025-03-26 | Stop reason: ALTCHOICE

## 2025-03-26 RX ORDER — HALOPERIDOL 5 MG/ML
1 INJECTION INTRAMUSCULAR
Status: DISCONTINUED | OUTPATIENT
Start: 2025-03-26 | End: 2025-03-26 | Stop reason: HOSPADM

## 2025-03-26 RX ORDER — MEPERIDINE HYDROCHLORIDE 25 MG/ML
12.5 INJECTION INTRAMUSCULAR; INTRAVENOUS; SUBCUTANEOUS
Status: DISCONTINUED | OUTPATIENT
Start: 2025-03-26 | End: 2025-03-26 | Stop reason: HOSPADM

## 2025-03-26 RX ORDER — SODIUM CHLORIDE 9 MG/ML
INJECTION, SOLUTION INTRAVENOUS PRN
Status: DISCONTINUED | OUTPATIENT
Start: 2025-03-26 | End: 2025-03-26 | Stop reason: HOSPADM

## 2025-03-26 RX ORDER — SODIUM CHLORIDE 0.9 % (FLUSH) 0.9 %
5-40 SYRINGE (ML) INJECTION EVERY 12 HOURS SCHEDULED
Status: DISCONTINUED | OUTPATIENT
Start: 2025-03-26 | End: 2025-03-26 | Stop reason: HOSPADM

## 2025-03-26 RX ORDER — LIDOCAINE HYDROCHLORIDE 20 MG/ML
INJECTION, SOLUTION EPIDURAL; INFILTRATION; INTRACAUDAL; PERINEURAL
Status: DISCONTINUED | OUTPATIENT
Start: 2025-03-26 | End: 2025-03-26 | Stop reason: SDUPTHER

## 2025-03-26 RX ORDER — FENTANYL CITRATE 50 UG/ML
INJECTION, SOLUTION INTRAMUSCULAR; INTRAVENOUS
Status: DISCONTINUED | OUTPATIENT
Start: 2025-03-26 | End: 2025-03-26 | Stop reason: SDUPTHER

## 2025-03-26 RX ORDER — PHENYLEPHRINE HCL IN 0.9% NACL 1 MG/10 ML
SYRINGE (ML) INTRAVENOUS
Status: DISCONTINUED | OUTPATIENT
Start: 2025-03-26 | End: 2025-03-26 | Stop reason: SDUPTHER

## 2025-03-26 RX ORDER — SODIUM CHLORIDE 9 MG/ML
INJECTION, SOLUTION INTRAVENOUS PRN
Status: DISCONTINUED | OUTPATIENT
Start: 2025-03-26 | End: 2025-03-26 | Stop reason: SDUPTHER

## 2025-03-26 RX ORDER — DIPHENHYDRAMINE HYDROCHLORIDE 50 MG/ML
12.5 INJECTION, SOLUTION INTRAMUSCULAR; INTRAVENOUS
Status: DISCONTINUED | OUTPATIENT
Start: 2025-03-26 | End: 2025-03-26 | Stop reason: HOSPADM

## 2025-03-26 RX ORDER — NALOXONE HYDROCHLORIDE 0.4 MG/ML
INJECTION, SOLUTION INTRAMUSCULAR; INTRAVENOUS; SUBCUTANEOUS PRN
Status: DISCONTINUED | OUTPATIENT
Start: 2025-03-26 | End: 2025-03-26 | Stop reason: HOSPADM

## 2025-03-26 RX ORDER — TRANEXAMIC ACID 10 MG/ML
1000 INJECTION, SOLUTION INTRAVENOUS
Status: DISCONTINUED | OUTPATIENT
Start: 2025-03-26 | End: 2025-03-26 | Stop reason: SDUPTHER

## 2025-03-26 RX ORDER — EPHEDRINE SULFATE 50 MG/ML
INJECTION INTRAVENOUS
Status: DISCONTINUED | OUTPATIENT
Start: 2025-03-26 | End: 2025-03-26 | Stop reason: SDUPTHER

## 2025-03-26 RX ORDER — DEXAMETHASONE SODIUM PHOSPHATE 10 MG/ML
8 INJECTION, SOLUTION INTRAMUSCULAR; INTRAVENOUS ONCE
Status: COMPLETED | OUTPATIENT
Start: 2025-03-26 | End: 2025-03-26

## 2025-03-26 RX ORDER — ONDANSETRON 2 MG/ML
4 INJECTION INTRAMUSCULAR; INTRAVENOUS
Status: COMPLETED | OUTPATIENT
Start: 2025-03-26 | End: 2025-03-26

## 2025-03-26 RX ORDER — PROMETHAZINE HYDROCHLORIDE 25 MG/1
25 TABLET ORAL EVERY 6 HOURS PRN
Qty: 15 TABLET | Refills: 0 | Status: SHIPPED | OUTPATIENT
Start: 2025-03-26 | End: 2025-04-02

## 2025-03-26 RX ORDER — HYDROCODONE BITARTRATE AND ACETAMINOPHEN 5; 325 MG/1; MG/1
1 TABLET ORAL ONCE
Status: COMPLETED | OUTPATIENT
Start: 2025-03-26 | End: 2025-03-26

## 2025-03-26 RX ORDER — SODIUM CHLORIDE 9 MG/ML
INJECTION, SOLUTION INTRAVENOUS CONTINUOUS
Status: DISCONTINUED | OUTPATIENT
Start: 2025-03-26 | End: 2025-03-26 | Stop reason: HOSPADM

## 2025-03-26 RX ORDER — ACETAMINOPHEN 500 MG
1000 TABLET ORAL ONCE
Status: COMPLETED | OUTPATIENT
Start: 2025-03-26 | End: 2025-03-26

## 2025-03-26 RX ORDER — SODIUM CHLORIDE 0.9 % (FLUSH) 0.9 %
5-40 SYRINGE (ML) INJECTION PRN
Status: DISCONTINUED | OUTPATIENT
Start: 2025-03-26 | End: 2025-03-26 | Stop reason: SDUPTHER

## 2025-03-26 RX ORDER — LORAZEPAM 2 MG/ML
0.5 INJECTION INTRAMUSCULAR
Status: DISCONTINUED | OUTPATIENT
Start: 2025-03-26 | End: 2025-03-26 | Stop reason: HOSPADM

## 2025-03-26 RX ORDER — GLYCOPYRROLATE 0.2 MG/ML
INJECTION INTRAMUSCULAR; INTRAVENOUS
Status: DISCONTINUED | OUTPATIENT
Start: 2025-03-26 | End: 2025-03-26 | Stop reason: SDUPTHER

## 2025-03-26 RX ORDER — MELOXICAM 7.5 MG/1
7.5 TABLET ORAL ONCE
Status: COMPLETED | OUTPATIENT
Start: 2025-03-26 | End: 2025-03-26

## 2025-03-26 RX ORDER — IBUPROFEN 800 MG/1
800 TABLET, FILM COATED ORAL EVERY 8 HOURS PRN
Qty: 30 TABLET | Refills: 0 | Status: SHIPPED | OUTPATIENT
Start: 2025-03-26 | End: 2025-04-05

## 2025-03-26 RX ORDER — ONDANSETRON 4 MG/1
4 TABLET, ORALLY DISINTEGRATING ORAL ONCE
Status: COMPLETED | OUTPATIENT
Start: 2025-03-26 | End: 2025-03-26

## 2025-03-26 RX ORDER — PROPOFOL 10 MG/ML
INJECTION, EMULSION INTRAVENOUS
Status: DISCONTINUED | OUTPATIENT
Start: 2025-03-26 | End: 2025-03-26 | Stop reason: SDUPTHER

## 2025-03-26 RX ORDER — FENTANYL CITRATE 50 UG/ML
50 INJECTION, SOLUTION INTRAMUSCULAR; INTRAVENOUS EVERY 5 MIN PRN
Status: DISCONTINUED | OUTPATIENT
Start: 2025-03-26 | End: 2025-03-26 | Stop reason: HOSPADM

## 2025-03-26 RX ORDER — SODIUM CHLORIDE 0.9 % (FLUSH) 0.9 %
5-40 SYRINGE (ML) INJECTION PRN
Status: DISCONTINUED | OUTPATIENT
Start: 2025-03-26 | End: 2025-03-26 | Stop reason: HOSPADM

## 2025-03-26 RX ORDER — TRANEXAMIC ACID 650 MG/1
1950 TABLET ORAL
Status: DISCONTINUED | OUTPATIENT
Start: 2025-03-26 | End: 2025-03-26

## 2025-03-26 RX ORDER — ONDANSETRON 2 MG/ML
INJECTION INTRAMUSCULAR; INTRAVENOUS
Status: DISCONTINUED | OUTPATIENT
Start: 2025-03-26 | End: 2025-03-26 | Stop reason: SDUPTHER

## 2025-03-26 RX ORDER — HYDROCODONE BITARTRATE AND ACETAMINOPHEN 5; 325 MG/1; MG/1
1 TABLET ORAL EVERY 4 HOURS PRN
Qty: 30 TABLET | Refills: 0 | Status: SHIPPED | OUTPATIENT
Start: 2025-03-26 | End: 2025-03-31

## 2025-03-26 RX ORDER — SODIUM CHLORIDE 9 MG/ML
INJECTION, SOLUTION INTRAVENOUS
Status: DISCONTINUED | OUTPATIENT
Start: 2025-03-26 | End: 2025-03-26 | Stop reason: SDUPTHER

## 2025-03-26 RX ORDER — SODIUM CHLORIDE 0.9 % (FLUSH) 0.9 %
5-40 SYRINGE (ML) INJECTION EVERY 12 HOURS SCHEDULED
Status: DISCONTINUED | OUTPATIENT
Start: 2025-03-26 | End: 2025-03-26 | Stop reason: SDUPTHER

## 2025-03-26 RX ORDER — CEFAZOLIN SODIUM 2 G/50ML
2000 SOLUTION INTRAVENOUS
Status: COMPLETED | OUTPATIENT
Start: 2025-03-26 | End: 2025-03-26

## 2025-03-26 RX ADMIN — ONDANSETRON 4 MG: 2 INJECTION, SOLUTION INTRAMUSCULAR; INTRAVENOUS at 08:36

## 2025-03-26 RX ADMIN — HYDROMORPHONE HYDROCHLORIDE 0.25 MG: 1 INJECTION, SOLUTION INTRAMUSCULAR; INTRAVENOUS; SUBCUTANEOUS at 10:07

## 2025-03-26 RX ADMIN — EPHEDRINE SULFATE 10 MG: 50 INJECTION, SOLUTION INTRAVENOUS at 08:28

## 2025-03-26 RX ADMIN — HYDROMORPHONE HYDROCHLORIDE 0.25 MG: 1 INJECTION, SOLUTION INTRAMUSCULAR; INTRAVENOUS; SUBCUTANEOUS at 10:00

## 2025-03-26 RX ADMIN — METHOCARBAMOL 250 MG: 100 INJECTION INTRAMUSCULAR; INTRAVENOUS at 08:36

## 2025-03-26 RX ADMIN — SODIUM CHLORIDE: 0.9 INJECTION, SOLUTION INTRAVENOUS at 07:02

## 2025-03-26 RX ADMIN — CEFAZOLIN SODIUM 2000 MG: 2 SOLUTION INTRAVENOUS at 08:20

## 2025-03-26 RX ADMIN — DEXAMETHASONE SODIUM PHOSPHATE 8 MG: 10 INJECTION, SOLUTION INTRAMUSCULAR; INTRAVENOUS at 07:01

## 2025-03-26 RX ADMIN — FENTANYL CITRATE 50 MCG: 50 INJECTION INTRAMUSCULAR; INTRAVENOUS at 08:38

## 2025-03-26 RX ADMIN — Medication 200 MCG: at 08:29

## 2025-03-26 RX ADMIN — PROPOFOL 200 MG: 10 INJECTION, EMULSION INTRAVENOUS at 08:19

## 2025-03-26 RX ADMIN — SODIUM CHLORIDE: 9 INJECTION, SOLUTION INTRAVENOUS at 08:14

## 2025-03-26 RX ADMIN — HYDROCODONE BITARTRATE AND ACETAMINOPHEN 1 TABLET: 5; 325 TABLET ORAL at 10:21

## 2025-03-26 RX ADMIN — METHOCARBAMOL 250 MG: 100 INJECTION INTRAMUSCULAR; INTRAVENOUS at 08:42

## 2025-03-26 RX ADMIN — GLYCOPYRROLATE 0.2 MG: 0.2 INJECTION INTRAMUSCULAR; INTRAVENOUS at 08:26

## 2025-03-26 RX ADMIN — Medication 100 MCG: at 08:27

## 2025-03-26 RX ADMIN — Medication 30 MG: at 08:50

## 2025-03-26 RX ADMIN — MELOXICAM 7.5 MG: 7.5 TABLET ORAL at 07:01

## 2025-03-26 RX ADMIN — ACETAMINOPHEN 1000 MG: 500 TABLET ORAL at 07:01

## 2025-03-26 RX ADMIN — METHOCARBAMOL 250 MG: 100 INJECTION INTRAMUSCULAR; INTRAVENOUS at 08:46

## 2025-03-26 RX ADMIN — METHOCARBAMOL 250 MG: 100 INJECTION INTRAMUSCULAR; INTRAVENOUS at 08:38

## 2025-03-26 RX ADMIN — HYDROMORPHONE HYDROCHLORIDE 0.25 MG: 1 INJECTION, SOLUTION INTRAMUSCULAR; INTRAVENOUS; SUBCUTANEOUS at 09:54

## 2025-03-26 RX ADMIN — FENTANYL CITRATE 50 MCG: 50 INJECTION INTRAMUSCULAR; INTRAVENOUS at 08:14

## 2025-03-26 RX ADMIN — FENTANYL CITRATE 50 MCG: 50 INJECTION INTRAMUSCULAR; INTRAVENOUS at 08:48

## 2025-03-26 RX ADMIN — TRANEXAMIC ACID 1950 MG: 650 TABLET ORAL at 07:01

## 2025-03-26 RX ADMIN — LIDOCAINE HYDROCHLORIDE 100 MG: 20 INJECTION, SOLUTION EPIDURAL; INFILTRATION; INTRACAUDAL; PERINEURAL at 08:19

## 2025-03-26 RX ADMIN — EPHEDRINE SULFATE 20 MG: 50 INJECTION, SOLUTION INTRAVENOUS at 08:31

## 2025-03-26 RX ADMIN — ONDANSETRON 4 MG: 2 INJECTION, SOLUTION INTRAMUSCULAR; INTRAVENOUS at 09:44

## 2025-03-26 RX ADMIN — ONDANSETRON 4 MG: 4 TABLET, ORALLY DISINTEGRATING ORAL at 10:51

## 2025-03-26 RX ADMIN — FENTANYL CITRATE 50 MCG: 50 INJECTION INTRAMUSCULAR; INTRAVENOUS at 09:36

## 2025-03-26 ASSESSMENT — PAIN DESCRIPTION - ORIENTATION
ORIENTATION: LEFT
ORIENTATION: LEFT

## 2025-03-26 ASSESSMENT — PAIN SCALES - GENERAL
PAINLEVEL_OUTOF10: 6
PAINLEVEL_OUTOF10: 5

## 2025-03-26 ASSESSMENT — ENCOUNTER SYMPTOMS: SHORTNESS OF BREATH: 0

## 2025-03-26 ASSESSMENT — PAIN DESCRIPTION - LOCATION
LOCATION: KNEE
LOCATION: KNEE

## 2025-03-26 ASSESSMENT — PAIN - FUNCTIONAL ASSESSMENT: PAIN_FUNCTIONAL_ASSESSMENT: 0-10

## 2025-03-26 ASSESSMENT — LIFESTYLE VARIABLES: SMOKING_STATUS: 0

## 2025-03-26 NOTE — PROGRESS NOTES
Pt arrived to pre procedure room 7 . A+Ox4. Consents reviewed/IV placed/assessment completed.    Pt educated on safety precautions and use of call light. Family at bedside.

## 2025-03-26 NOTE — PROGRESS NOTES
Pt discharged from PACU in stable condition.  Pt is still having nausea at this time but meets discharged criteria.

## 2025-03-26 NOTE — PROGRESS NOTES
Pt has an episode of vomiting while getting dressed.  Dr Palacios was notified and Zofran ODT was ordered.  Pt states nausea is relieved after vomiting.

## 2025-03-26 NOTE — ANESTHESIA POSTPROCEDURE EVALUATION
Department of Anesthesiology  Postprocedure Note    Patient: Dior Mullen  MRN: [id number]  YOB: 1980  Date of evaluation: 3/26/2025    Procedure Summary       Date: 03/26/25 Room / Location: [address]    Anesthesia Start: 0814 Anesthesia Stop: 0927    Procedure: LEFT KNEE REMOVAL OF LOOSE BODY/BIOPSY AND LEFT KNEE MEDIAL MENISCUS REPAIR (Left: Knee) Diagnosis:       Loose body of left knee      Other tear of medial meniscus, current injury, left knee, subsequent encounter      (Loose body of left knee [M23.42])      (Other tear of medial meniscus, current injury, left knee, subsequent encounter [S83.242D])    Surgeons: Robert Parra MD Responsible Provider: Robert Palacios MD    Anesthesia Type: General ASA Status: 1            Anesthesia Type: General    Ann-Marie Phase I: Ann-Marie Score: 8    Ann-Marie Phase II: Ann-Marie Score: 10    Anesthesia Post Evaluation    Patient location during evaluation: PACU  Patient participation: complete - patient participated  Level of consciousness: awake and alert  Pain score: 5  Nausea & Vomiting: no nausea  Cardiovascular status: hemodynamically stable  Respiratory status: acceptable  Hydration status: stable  Pain management: adequate    There were no known notable events for this encounter.

## 2025-03-26 NOTE — ANESTHESIA PRE PROCEDURE
Department of Anesthesiology  Preprocedure Note       Name:  Dior Mullen   Age:  44 y.o.  :  1980                                          MRN:  [id number]         Date:  3/26/2025      Surgeon: Surgeon(s):  Robert Parra MD    Procedure: Procedure(s):  LEFT KNEE REMOVAL OF LOOSE BODY/BIOPSY AND LEFT KNEE MEDIAL MENISCUS REPAIR    Medications prior to admission:   Prior to Admission medications    Medication Sig Start Date End Date Taking? Authorizing Provider   Cyanocobalamin (B-12) 1000 MCG TBCR Take 1,000 mcg by mouth daily 3/12/25  Yes Brianna Lao MD   Cholecalciferol 50 MCG (2000 UT) TABS Take 1 tablet by mouth daily 3/12/25  Yes Brianna Lao MD   omega-3 acid ethyl esters (LOVAZA) 1 g capsule Take 2 capsules by mouth 2 times daily (with meals)  Patient not taking: Reported on 3/26/2025 3/12/25   Brianna Lao MD   acetaminophen (TYLENOL) 500 MG tablet Take 1 tablet by mouth every 6 hours as needed    Brianna Lao MD       Current medications:    Current Facility-Administered Medications   Medication Dose Route Frequency Provider Last Rate Last Admin    sodium chloride flush 0.9 % injection 5-40 mL  5-40 mL IntraVENous 2 times per day Cate Orellana MD        sodium chloride flush 0.9 % injection 5-40 mL  5-40 mL IntraVENous PRN Cate Orellana MD        0.9 % sodium chloride infusion   IntraVENous PRN Cate Orellana MD        tranexamic acid (LYSTEDA) tablet 1,950 mg  1,950 mg Oral 60 Min Pre-Op Robert Parra MD   1,950 mg at 25 0701    0.9 % sodium chloride infusion   IntraVENous Continuous Robert Parra  mL/hr at 25 0702 New Bag at 25 0702    ceFAZolin (ANCEF) 2000 mg in dextrose 3 % 50 mL IVPB (duplex)  2,000 mg IntraVENous On Call to OR Robert Parra MD           Allergies:  No Known Allergies    Problem List:    Patient Active Problem List   Diagnosis Code    Multiple lipomas

## 2025-03-26 NOTE — BRIEF OP NOTE
Brief Postoperative Note      Patient: Dior Mullen  YOB: 1980  MRN: [id number]    Date of Procedure: 3/26/2025    Pre-Op Diagnosis Codes:      * Loose body of left knee [M23.42]     * Other tear of medial meniscus, current injury, left knee, subsequent encounter [S83.242D]    Post-Op Diagnosis: Same       Procedure(s):  LEFT KNEE REMOVAL OF LOOSE BODY/BIOPSY AND LEFT KNEE MEDIAL MENISCUS REPAIR    Surgeon(s):  Gregg Wilkins MD    Assistant:  Surgical Assistant: Farooq Cowan Jon    Anesthesia: General    Estimated Blood Loss (mL): less than 50     Complications: None    Specimens:   ID Type Source Tests Collected by Time Destination   A : MASS SYNOVIUM LEFT KNEE Tissue Tissue SURGICAL PATHOLOGY Gregg Wilkins MD 3/26/2025 0852        Implants:  Implant Name Type Inv. Item Serial No.  Lot No. LRB No. Used Action   ANCHOR SUTURE 12 DEG DIA1.5 MM SUTURE 2-0 ARTHSCP UP CRV - [id number]  ANCHOR SUTURE 12 DEG DIA1.5 MM SUTURE 2-0 ARTHSCP UP [employer]  [employer]WD [id number] Left 1 Implanted         Drains: * No LDAs found *    Findings:  Infection Present At Time Of Surgery (PATOS) (choose all levels that have infection present):  No infection present  Other Findings: Vertical tear of the outer one third of the posterior horn medial meniscus with a small radial tear in the mid body.  Ovoid 3 x 3 cm fatty mass in the superior lateral aspect of the patellar pouch excised through open incision and sent for pathology consistent with intraarticular lipoma    Electronically signed by GREGG WILKINS MD on 3/26/2025 at 9:00 AM

## 2025-03-26 NOTE — INTERVAL H&P NOTE
Patient seen and examined.  I have reviewed the history and physical and examined the patient and find no relevant changes.  Surgery plan reviewed.    Site marked and consent verified.  All questions answered and antibiotic verified.    Ready for left knee arthroscopic medial meniscus repair and open removal of loose body / excisional biopsy          Robert Parra MD, FAAOS  Board Certified Orthopaedic Surgeon  [facility]  [address] [place]    Phone:[phone]  Fax [phone]    03/26/25  7:58 AM

## 2025-03-27 LAB — SURGICAL PATHOLOGY REPORT: NORMAL

## 2025-04-03 ENCOUNTER — TELEPHONE (OUTPATIENT)
Dept: ORTHOPEDIC SURGERY | Age: 45
End: 2025-04-03

## 2025-04-03 ENCOUNTER — RESULTS FOLLOW-UP (OUTPATIENT)
Age: 45
End: 2025-04-03

## 2025-04-03 NOTE — TELEPHONE ENCOUNTER
3/26/2025     LEFT KNEE REMOVAL OF LOOSE BODY/BIOPSY AND LEFT KNEE MEDIAL MENISCUS REPAIR        Call sent to triage     Patient calling with complaints of rash on front and back of knee, top bandage has been taken off and I suggested to start taking benadryl to help keep the rash from spreading, made aware I will reach out to Dr. An team and someone will follow up with them       [phone]- Henrietta, wife   diminished

## 2025-04-03 NOTE — TELEPHONE ENCOUNTER
Beulah Rader,      Here is what Dr. Parra said \"It looks like you have developed shingles which sometime occurs when the body experience a stress like a surgery.  A medication has been sent to your pharmacy to reduce the severity and a course of steroids to reduce the itching.\"       Persons with shingles should be isolated from individuals who are susceptible to chickenpox (e.g., those who have never had chickenpox or the vaccine) until all blisters have crusted over.   Avoid touching or scratching blisters.  Use separate towels and bedding for the person with shingles.  Wash all contaminated clothing and bedding in hot water.  Shingles is typically contagious until all blisters have crusted over, which usually takes about 7-10 days.   Let us know is ou have any questions.

## 2025-04-03 NOTE — TELEPHONE ENCOUNTER
Called wife. Rash started 3 days ago. No fever. He is not sleeping because it itches. She has been putting hydrocortisone on it and gave him benedryl. Asked that she send us pictures in [employer].

## 2025-04-07 ENCOUNTER — OFFICE VISIT (OUTPATIENT)
Age: 45
End: 2025-04-07

## 2025-04-07 VITALS — WEIGHT: 185 LBS | BODY MASS INDEX: 29.03 KG/M2 | HEIGHT: 67 IN

## 2025-04-07 DIAGNOSIS — S83.241A TEAR OF MEDIAL MENISCUS OF RIGHT KNEE, UNSPECIFIED TEAR TYPE, UNSPECIFIED WHETHER OLD OR CURRENT TEAR, INITIAL ENCOUNTER: ICD-10-CM

## 2025-04-07 DIAGNOSIS — D48.19 TENOSYNOVIAL GIANT CELL TUMOR OF KNEE: Primary | ICD-10-CM

## 2025-04-07 DIAGNOSIS — S83.242D OTHER TEAR OF MEDIAL MENISCUS, CURRENT INJURY, LEFT KNEE, SUBSEQUENT ENCOUNTER: ICD-10-CM

## 2025-04-07 NOTE — PROGRESS NOTES
Dior Mullen  [id number]  Encounter Date: 4/7/2025  YOB: 1980    Chief Complaint   Patient presents with    Post-Op Check     L knee medial meniscus repair and removal of intra-articular mass, not in physical therapy, recent shingles       History:Mr. Dior Mullen is here in follow up 2 weeks after his left knee arthroscopy with medial meniscus repair and then open removal of superior lateral soft tissue mass within the synovium.  This was excised and sent to pathology.  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  His overall pain level is averaging 3/10.  He did get some rash from his dressings in the posterior aspect of the knee and this may have been a flare of shingles versus dermatitis.  Seems to be responding to steroid medication and Valtrex.  No current itching or blisters.  He is accompanied by his wife.  Concern now is increasing pain in the medial aspect of his right knee.  Has had previous right knee arthroscopy in 2023.    Exam:  Ht 1.702 m (5' 7\")   Wt 83.9 kg (185 lb)   BMI 28.98 kg/m²   General: Alert and oriented x3, No acute distress, Cooperative and conversant.  Obesity Absent   Mood and affect are appropriate.  Left knee: Portal sites and incision area are well-healed.  Soft tissue swelling and effusion are resolving as expected.  He can achieve full extension and tolerates flexion to about 90 degrees.  No significant tenderness along the joint line.  He is able to hold a straight leg raise.  Quad strength 3+ to 4 -/5.  Calf soft with negative Homans' sign.  Sensation to light touch grossly present throughout the left lower extremity  Capillary refill < 2 seconds  Right knee: Slight varus alignment.  Moderate tenderness along the medial joint line with pain on Ron's.  He can achieve full extension.  Flexion becomes uncomfortable around the 115 degrees medially.  No gross instability to drawer or Lachman's testing.  No instability to varus or valgus stress

## 2025-04-09 PROBLEM — M23.42 LOOSE BODY OF LEFT KNEE: Status: ACTIVE | Noted: 2025-04-09

## 2025-04-09 NOTE — OP NOTE
details.      Operative Report:  Indications: The patient is a 44 y.o. male with history of  left knee while playing volleyball in early February.  The injury resulted in swelling and difficulty with range of motion.  He has also had symptoms of a \"ball-like\" structure protruding along the superior lateral aspect of his knee which seems to be getting worse.  He is not sure when he first noticed this.  MRI showed a large ovoid semisolid mass/loose body in the suprapatellar pouch and a medial meniscus tear.  I have reviewed with him options for treating both items including arthroscopic meniscal debridement versus repair as well as open removal of the loose body due to its size.  After reviewing the risks and benefits he was prepared to proceed.  All of his questions were answered to his satisfaction.    Description:  The patient was identified in the pre-op holding area and the correct site of the left knee was verified and marked.      The patient was brought to the operating room and placed on the table in supine position and general anesthesia was administered.  A well-padded non-sterile tourniquet was applied to the operative limb.  Functioning SCDs applied to the non-operative lower extremity.  Care was taken to ensure all bony prominences were padded.  The right lower extremity was placed in a yellowfin stirrup and care was taken to ensure protection of the peroneal nerve region.  The left lower extremity was secured in a low-profile arthroscopic limp palafox.  It was then prepped and draped in standard sterile fashion.  Surgical time out was call to verify necessary data including administration of his antibiotic.  The limb was exsanguinated and the tourniquet inflated to 250 mmHg.      Standard anterior inferior lateral portal was created with a #11 blade.  The trocar / cannula were inserted into the knee and the trocar was exchanged for the camera.  Arthroscopic fluid flow was initiated via

## 2025-04-15 ENCOUNTER — HOSPITAL ENCOUNTER (OUTPATIENT)
Age: 45
Setting detail: THERAPIES SERIES
Discharge: HOME | End: 2025-04-15
Payer: COMMERCIAL

## 2025-04-15 DIAGNOSIS — M25.662 DECREASED ROM OF LEFT KNEE: ICD-10-CM

## 2025-04-15 DIAGNOSIS — R52 INCREASED PAIN: ICD-10-CM

## 2025-04-15 DIAGNOSIS — R53.1 DECREASED STRENGTH: Primary | ICD-10-CM

## 2025-04-15 DIAGNOSIS — R26.9 GAIT ABNORMALITY: ICD-10-CM

## 2025-04-15 PROCEDURE — 97161 PT EVAL LOW COMPLEX 20 MIN: CPT | Performed by: PHYSICAL THERAPIST

## 2025-04-15 PROCEDURE — 97110 THERAPEUTIC EXERCISES: CPT | Performed by: PHYSICAL THERAPIST

## 2025-04-15 NOTE — THERAPY EVALUATION
[facility] - Outpatient Rehabilitation and Therapy: [address] [place] [address]   office: [phone]  fax: [phone]     Physical Therapy Initial Evaluation Certification      Dear Robert Parra MD ,    We had the pleasure of evaluating the following patient for physical therapy services at [facility].  A summary of our findings can be found in the initial assessment below.  This includes our plan of care.  If you have any questions or concerns regarding these findings, please do not hesitate to contact me at the office phone number listed above.  Thank you for the referral.     Physician Signature:_______________________________Date:__________________  By signing above (or electronic signature), therapist’s plan is approved by physician       Physical Therapy: TREATMENT/PROGRESS NOTE   Patient: Dior Mullen (44 y.o. male)   Examination Date: 04/15/2025   :  1980 MRN: [id number]   Visit #: 1   Insurance Allowable Auth Needed   [place] []Yes    [x]No    Insurance: Payor: [employer] / Plan: [facility] CHOICE PLUS / Product Type: *No Product type* /   Insurance ID: [id number] - (Commercial)  Secondary Insurance (if applicable):    Treatment Diagnosis:      ICD-10-CM    1. Decreased strength  R53.1       2. Decreased ROM of left knee  M25.662       3. Increased pain  R52       4. 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

## 2025-04-23 ENCOUNTER — HOSPITAL ENCOUNTER (OUTPATIENT)
Age: 45
Setting detail: THERAPIES SERIES
Discharge: HOME | End: 2025-04-23
Payer: COMMERCIAL

## 2025-04-23 PROCEDURE — 97110 THERAPEUTIC EXERCISES: CPT | Performed by: PHYSICAL THERAPIST

## 2025-04-23 PROCEDURE — 97530 THERAPEUTIC ACTIVITIES: CPT | Performed by: PHYSICAL THERAPIST

## 2025-04-23 NOTE — FLOWSHEET NOTE
[facility] - Outpatient Rehabilitation and Therapy: [address]  [place] [address]   office: [phone]  fax: [phone]      Physical Therapy: TREATMENT/PROGRESS NOTE   Patient: Dior Mullen (44 y.o. male)   Examination Date: 2025   :  1980 MRN: [id number]   Visit #: 2   Insurance Allowable Auth Needed   [place] []Yes    [x]No    Insurance: Payor: [employer] / Plan: [facility] CHOICE PLUS / Product Type: *No Product type* /   Insurance ID: [id number] - (Commercial)  Secondary Insurance (if applicable):    Treatment Diagnosis:    No diagnosis found.     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]   Referring Physician: Robert Parra, *  PCP: Mera Flores MD     Plan of care signed (Y/N):     Date of Patient follow up with Physician: 25     Plan of Care Report: NO  POC update due: (10 visits /OR AUTH LIMITS, whichever is less)   5/15/2025                                             Medical History:  Comorbidities:  None  Relevant Medical History: right knee menisectomy                                         Precautions/ Contra-indications:           Latex allergy:  NO  Pacemaker:    NO  Contraindications for Manipulation: None and recent surgical history (relative)  Date of Surgery: 3/26/25  Other:  s/p Left  knee MMR/loose body removal    Red Flags:  None    Suicide Screening:   The patient did not verbalize a primary behavioral concern, suicidal ideation, suicidal intent, or demonstrate suicidal behaviors.    Preferred Language for Healthcare:   [x] English       [] other:    SUBJECTIVE EXAMINATION     Patient stated complaint: 4 weeks post-op. Patient has discomfort below the patella on bilateral knees. No issue with the exercises at home.         Test used Initial score  4/15/25 2025   Pain Summary VAS 2/10 2/10   Functional questionnaire LEFS 44%    Other:

## 2025-04-29 ENCOUNTER — PATIENT MESSAGE (OUTPATIENT)
Age: 45
End: 2025-04-29

## 2025-04-30 ENCOUNTER — HOSPITAL ENCOUNTER (OUTPATIENT)
Age: 45
Setting detail: THERAPIES SERIES
Discharge: HOME | End: 2025-04-30
Payer: COMMERCIAL

## 2025-04-30 PROCEDURE — 97530 THERAPEUTIC ACTIVITIES: CPT | Performed by: PHYSICAL THERAPIST

## 2025-04-30 PROCEDURE — 97110 THERAPEUTIC EXERCISES: CPT | Performed by: PHYSICAL THERAPIST

## 2025-04-30 NOTE — FLOWSHEET NOTE
flexibility, endurance, ROM performed to prevent loss of range of motion, maintain or improve muscular strength or increase flexibility, following either an injury or surgery.   (96698) THERAPEUTIC ACTIVITY - use of dynamic activities to improve functional performance. (Ex include squatting, ascending/descending stairs, walking, bending, lifting, catching, throwing, pushing, pulling, jumping.)  Direct, one on one contact, billed in 15-minute increments.    GOALS     Patient stated goal: stretch, walk, perform ADLs  [] Progressing: [] Met: [] Not Met: [] Adjusted    Therapist goals for Patient:   Short Term Goals: To be achieved in: 2 weeks  1Independent in HEP and progression per patient tolerance, in order to prevent re-injury.   [] Progressing: [] Met: [] Not Met: [] Adjusted  Patient will have a decrease in pain to <2/10 to facilitate improvement in movement, function, and ADLs as indicated by Functional Deficits.  [] Progressing: [] Met: [] Not Met: [] Adjusted    IF APPLICABLE:  [] Patient to demonstrate independence in wear and care for custom orthotic device. (Only if applicable for orthotic eval)     Long Term Goals: To be achieved in: 12 weeks  Disability index score of 25% or less for the LEFS to assist with reaching prior level of function with activities such as walking and standing.  [] Progressing: [] Met: [] Not Met: [] Adjusted  Patient will demonstrate increased AROM of left knee to WNL without pain to allow for proper joint functioning to enable patient to kneel, squat.   [] Progressing: [] Met: [] Not Met: [] Adjusted  Patient will demonstrate increased Strength of left LE to at least 5/5 throughout without pain to allow for proper functional mobility to enable patient to return to walking, recreational activities.   [] Progressing: [] Met: [] Not Met: [] Adjusted  Patient will return to ADLs and household activities without increased symptoms or restriction.   [] Progressing: [] Met: [] Not Met:

## 2025-05-02 NOTE — TELEPHONE ENCOUNTER
Tried to call patient since he has not read [employer] message. VM is not set up. All of the other numbers in his chart is his wifes number. Unfortunately on his HIPAA he marked that we can not talk to anyone but him.

## 2025-05-05 ENCOUNTER — OFFICE VISIT (OUTPATIENT)
Age: 45
End: 2025-05-05
Payer: COMMERCIAL

## 2025-05-05 VITALS — BODY MASS INDEX: 29.03 KG/M2 | HEIGHT: 67 IN | WEIGHT: 185 LBS

## 2025-05-05 DIAGNOSIS — D48.19 TENOSYNOVIAL GIANT CELL TUMOR OF KNEE: Primary | ICD-10-CM

## 2025-05-05 DIAGNOSIS — S83.242D OTHER TEAR OF MEDIAL MENISCUS, CURRENT INJURY, LEFT KNEE, SUBSEQUENT ENCOUNTER: ICD-10-CM

## 2025-05-05 DIAGNOSIS — S83.241A TEAR OF MEDIAL MENISCUS OF RIGHT KNEE, UNSPECIFIED TEAR TYPE, UNSPECIFIED WHETHER OLD OR CURRENT TEAR, INITIAL ENCOUNTER: ICD-10-CM

## 2025-05-05 PROCEDURE — 99212 OFFICE O/P EST SF 10 MIN: CPT | Performed by: ORTHOPAEDIC SURGERY

## 2025-05-05 PROCEDURE — 99024 POSTOP FOLLOW-UP VISIT: CPT | Performed by: ORTHOPAEDIC SURGERY

## 2025-05-07 ENCOUNTER — HOSPITAL ENCOUNTER (OUTPATIENT)
Age: 45
Setting detail: THERAPIES SERIES
Discharge: HOME | End: 2025-05-07
Payer: COMMERCIAL

## 2025-05-07 PROCEDURE — 97530 THERAPEUTIC ACTIVITIES: CPT | Performed by: PHYSICAL THERAPIST

## 2025-05-07 PROCEDURE — 97110 THERAPEUTIC EXERCISES: CPT | Performed by: PHYSICAL THERAPIST

## 2025-05-07 NOTE — FLOWSHEET NOTE
[facility] - Outpatient Rehabilitation and Therapy: [address]  [place] [address]   office: [phone]  fax: [phone]      Physical Therapy: TREATMENT/PROGRESS NOTE   Patient: Dior Mullen (44 y.o. male)   Examination Date: 2025   :  1980 MRN: [id number]   Visit #: 4   Insurance Allowable Auth Needed   [place] []Yes    [x]No    Insurance: Payor: [employer] / Plan: [facility] CHOICE PLUS / Product Type: *No Product type* /   Insurance ID: [id number] - (Commercial)  Secondary Insurance (if applicable):    Treatment Diagnosis:    No diagnosis found.     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]   Referring Physician: Robert Parra, *  PCP: Mera Flores MD     Plan of care signed (Y/N):     Date of Patient follow up with Physician:      Plan of Care Report: NO  POC update due: (10 visits /OR AUTH LIMITS, whichever is less)   5/15/2025                                             Medical History:  Comorbidities:  None  Relevant Medical History: right knee menisectomy                                         Precautions/ Contra-indications:           Latex allergy:  NO  Pacemaker:    NO  Contraindications for Manipulation: None and recent surgical history (relative)  Date of Surgery: 3/26/25  Other:  s/p Left  knee MMR/loose body removal    Red Flags:  None    Suicide Screening:   The patient did not verbalize a primary behavioral concern, suicidal ideation, suicidal intent, or demonstrate suicidal behaviors.    Preferred Language for Healthcare:   [x] English       [] other:    SUBJECTIVE EXAMINATION     Patient stated complaint: 6 weeks post-op. Patient has pain with prolonged walking. Located at inferior patella/joint line/patellar tendon region. Scar tissue along lateral incision is concerning to him.        Test used Initial score  4/15/25 2025   Pain Summary VAS 
Airborne/Contact

## 2025-05-26 NOTE — PROGRESS NOTES
Dior Mullen  [id number]  Encounter Date: 5/5/2025  YOB: 1980    Chief Complaint   Patient presents with    Post-Op Check     L knee medial meniscal repair and removal loose body       History:Mr. 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 He is 6 weeks post-op and has been working in outpatient physical therapy. Pain at most is 4/10.     He reports a gradual return to normal function in his left knee, with therapy progressing satisfactorily. However, he experiences difficulty with stair navigation and walking, attributing this to discomfort in the patellar tendon region. He also notes occasional pain in the same area during sleep. He has attempted to walk a distance of 0.2 miles but was unable to continue beyond this point. He has one remaining physical therapy session scheduled. He has been managing his symptoms with Bengay and cold packs, and occasionally uses athletic sleeves.    His right knee is still bothering him along the medial aspect of the joint line, but he has not yet scheduled his MRI that was ordered at his last visit.    SOCIAL HISTORY  Exercise: Walking, 0.2 miles every other day      Exam:  Ht 1.702 m (5' 7\")   Wt 83.9 kg (185 lb)   BMI 28.98 kg/m²   General: Alert and oriented x3, No acute distress, Cooperative and conversant.  Obesity Absent   Mood and affect are appropriate.  Left knee : Trace effusion.  Portal sites and lateral incision from removal of the mass well-healed.  No tenderness to direct palpation.  He has full extension and flexion in a nonweightbearing position without pain.  Good active quad extension against resistance at 5 -/5.  No instability to ligamentous assessment.  Right knee: Continues with mild to moderate tenderness along the medial joint line.  No lateral joint line

## 2025-06-07 ENCOUNTER — HOSPITAL ENCOUNTER (OUTPATIENT)
Age: 45
Discharge: HOME | End: 2025-06-07
Attending: ORTHOPAEDIC SURGERY
Payer: COMMERCIAL

## 2025-06-07 DIAGNOSIS — S83.241A TEAR OF MEDIAL MENISCUS OF RIGHT KNEE, UNSPECIFIED TEAR TYPE, UNSPECIFIED WHETHER OLD OR CURRENT TEAR, INITIAL ENCOUNTER: ICD-10-CM

## 2025-06-07 PROCEDURE — 73721 MRI JNT OF LWR EXTRE W/O DYE: CPT

## 2025-06-16 ENCOUNTER — OFFICE VISIT (OUTPATIENT)
Age: 45
End: 2025-06-16

## 2025-06-16 VITALS — HEIGHT: 67 IN | WEIGHT: 185 LBS | BODY MASS INDEX: 29.03 KG/M2

## 2025-06-16 DIAGNOSIS — S83.241A TEAR OF MEDIAL MENISCUS OF RIGHT KNEE, UNSPECIFIED TEAR TYPE, UNSPECIFIED WHETHER OLD OR CURRENT TEAR, INITIAL ENCOUNTER: ICD-10-CM

## 2025-06-16 DIAGNOSIS — M17.11 PRIMARY OSTEOARTHRITIS OF RIGHT KNEE: Primary | ICD-10-CM

## 2025-06-18 ENCOUNTER — TELEPHONE (OUTPATIENT)
Age: 45
End: 2025-06-18

## 2025-06-23 ENCOUNTER — OFFICE VISIT (OUTPATIENT)
Age: 45
End: 2025-06-23
Payer: COMMERCIAL

## 2025-06-23 VITALS — HEIGHT: 67 IN | WEIGHT: 185 LBS | BODY MASS INDEX: 29.03 KG/M2

## 2025-06-23 DIAGNOSIS — M17.11 PRIMARY OSTEOARTHRITIS OF RIGHT KNEE: Primary | ICD-10-CM

## 2025-06-23 PROCEDURE — 99024 POSTOP FOLLOW-UP VISIT: CPT | Performed by: ORTHOPAEDIC SURGERY

## 2025-06-23 PROCEDURE — 20610 DRAIN/INJ JOINT/BURSA W/O US: CPT | Performed by: ORTHOPAEDIC SURGERY

## 2025-06-23 NOTE — PROGRESS NOTES
Subjective:  right knee pain.   He is here for a Durolane injection for the  right knee(s).     Objective:   Height 1.702 m (5' 7\"), weight 83.9 kg (185 lb).    There is a mild joint effusion noted of the right knee(s). There is mild pain with range of motion testing. There is no significant  instability noted.    Neuro exam grossly intact both lower extremities. Intact sensation to light touch. Motor exam 5/5 in all major motor groups.  Negative Michel's sign.    Skin is warm, dry and intact with out erythema or significant increased temperature around the knee joint(s).     Assessment:  Degenerative Joint Disease of the right Knee(s).    Plan:  Discussed Durolane injection including all  risks and benefits including increased pain, drug reaction, infection, bleeding, lack of improvement and  neurovascular injury.   All the questions were answered.    PROCEDURE NOTE:  PRE-PROCEDURE DIAGNOSIS: DJD right knee  POST-PROCEDURE DIAGNOSIS: DJD right knee    With the patient's permission, his right knee was prepped in standard sterile fashion with betadine and alcohol. The prefilled 3mL injection of the preferred Durolane (60mg) was injected into the right lateral joint space compartment without difficulty.  The patient tolerated the procedure(s) well without difficulty.  A band-aid was applied.     POST-PROCEDURE INSTRUCTIONS GIVEN TO PATIENT: The patient was advised to ice the knee for 15-20 minutes to relieve any injection site related pain. Decrease activity for the next 24 to 48 hours. May use prescription or OTC pain relievers as needed      FOLLOW-UP:   As directed or call or return to clinic if these symptoms worsen or fail to improve as anticipated. If at any time you are concerned you may contact the office for further instructions or care.         Robert Parra MD, FAAOS  Board Certified Orthopaedic Surgeon  [facility]  [place]    Phone:[phone]  Fax

## (undated) DEVICE — MERCY HEALTH WEST TURNOVER: Brand: MEDLINE INDUSTRIES, INC.

## (undated) DEVICE — JEWISH HOSPITAL TURNOVER KIT: Brand: MEDLINE INDUSTRIES, INC.

## (undated) DEVICE — DRESSING,GAUZE,XEROFORM,CURAD,1"X8",ST: Brand: CURAD

## (undated) DEVICE — COTTON UNDERCAST PADDING,CRIMPED FINISH: Brand: WEBRIL

## (undated) DEVICE — COVER,MAYO STAND,XL,STERILE: Brand: MEDLINE

## (undated) DEVICE — HANDPIECE SET WITH HIGH FLOW TIP AND SUCTION TUBE: Brand: INTERPULSE

## (undated) DEVICE — KNOT PUSHER/ PORTAL SKID

## (undated) DEVICE — MAT ABSRB W28XL48IN C6L FLR ULT W POLY BK

## (undated) DEVICE — SUTURE MONOCRYL + SZ 4-0 L18IN ABSRB UD L19MM PS-2 3/8 CIR MCP496G

## (undated) DEVICE — GOWN,AURORA,NONREINF,RAGLAN,XXL,STERILE: Brand: MEDLINE

## (undated) DEVICE — STERILE VELCLOSE ELASTIC BANDAGE, 6IN: Brand: VELCLOSE

## (undated) DEVICE — PAD ABSRB W8XL10IN ABD HYDROPHOBIC NONWOVEN THCK LAYR CELOS

## (undated) DEVICE — 4.5 MM FULL RADIUS ELITE STRAIGHT                                    DISPOSABLE BLADES, MAROON,PACKAGED 6                                    PER BOX, STERILE

## (undated) DEVICE — BANDAGE,ELASTIC,ESMARK,STERILE,6"X9',LF: Brand: MEDLINE

## (undated) DEVICE — ELECTRODE PT RET AD L9FT HI MOIST COND ADH HYDRGEL CORDED

## (undated) DEVICE — BANDAGE,GAUZE,BULKEE II,4.5"X4.1YD,STRL: Brand: MEDLINE

## (undated) DEVICE — 4-PORT MANIFOLD: Brand: NEPTUNE 2

## (undated) DEVICE — GAUZE,SPONGE,4"X4",8PLY,STRL,LF,10/TRAY: Brand: MEDLINE

## (undated) DEVICE — PAD,ABDOMINAL,8"X10",ST,LF: Brand: MEDLINE

## (undated) DEVICE — TUBING, SUCTION, 1/4" X 10', STRAIGHT: Brand: MEDLINE

## (undated) DEVICE — LEGGINGS, PAIR, 31X48, STERILE: Brand: MEDLINE

## (undated) DEVICE — GLOVE ORANGE PI 8 1/2   MSG9085

## (undated) DEVICE — SHEET,DRAPE,53X77,STERILE: Brand: MEDLINE

## (undated) DEVICE — BASIC SINGLE BASIN 1-LF: Brand: MEDLINE INDUSTRIES, INC.

## (undated) DEVICE — FLUID TRAP FOR MINIVAC ES EQUIP FLD TRAP

## (undated) DEVICE — APPLICATOR MEDICATED 26 CC SOLUTION HI LT ORNG CHLORAPREP

## (undated) DEVICE — T-DRAPE,EXTREMITY,STERILE: Brand: MEDLINE

## (undated) DEVICE — Device

## (undated) DEVICE — STERILE LATEX POWDER FREE SURGICAL GLOVES WITH HYDROGEL COATING: Brand: PROTEXIS

## (undated) DEVICE — SPONGE LAP W18XL18IN WHT COT 4 PLY FLD STRUNG RADPQ DISP ST 2 PER PACK

## (undated) DEVICE — DRAPE,U/ SHT,SPLIT,PLAS,STERIL: Brand: MEDLINE

## (undated) DEVICE — STRIP,CLOSURE,WOUND,MEDI-STRIP,1/2X4: Brand: MEDLINE

## (undated) DEVICE — GLOVE SURG SZ 8.5 L11.85IN FNGR THK9.8MIL STRW LTX POLYMER

## (undated) DEVICE — LIGHT HANDLE: Brand: DEVON

## (undated) DEVICE — ZIMMER® STERILE DISPOSABLE TOURNIQUET CUFF WITH PLC, DUAL PORT, SINGLE BLADDER, 34 IN. (86 CM)

## (undated) DEVICE — STOCKINETTE,IMPERVIOUS,12X48,STERILE: Brand: MEDLINE